# Patient Record
Sex: FEMALE | Race: WHITE | NOT HISPANIC OR LATINO | Employment: PART TIME | URBAN - METROPOLITAN AREA
[De-identification: names, ages, dates, MRNs, and addresses within clinical notes are randomized per-mention and may not be internally consistent; named-entity substitution may affect disease eponyms.]

---

## 2017-06-27 ENCOUNTER — TRANSCRIBE ORDERS (OUTPATIENT)
Dept: ADMINISTRATIVE | Facility: HOSPITAL | Age: 47
End: 2017-06-27

## 2017-06-27 DIAGNOSIS — Z12.31 VISIT FOR SCREENING MAMMOGRAM: Primary | ICD-10-CM

## 2017-07-10 ENCOUNTER — HOSPITAL ENCOUNTER (OUTPATIENT)
Dept: MAMMOGRAPHY | Facility: HOSPITAL | Age: 47
Discharge: HOME | End: 2017-07-10
Attending: OBSTETRICS & GYNECOLOGY | Admitting: OBSTETRICS & GYNECOLOGY

## 2017-07-10 DIAGNOSIS — Z12.31 VISIT FOR SCREENING MAMMOGRAM: ICD-10-CM

## 2017-07-10 PROCEDURE — 77063 BREAST TOMOSYNTHESIS BI: CPT

## 2017-07-10 PROCEDURE — 77067 SCR MAMMO BI INCL CAD: CPT | Performed by: RADIOLOGY

## 2017-07-10 PROCEDURE — 77063 BREAST TOMOSYNTHESIS BI: CPT | Performed by: RADIOLOGY

## 2017-07-10 PROCEDURE — G0202 SCR MAMMO BI INCL CAD: HCPCS

## 2017-09-18 DIAGNOSIS — M25.551 PAIN OF BOTH HIP JOINTS: Primary | ICD-10-CM

## 2017-09-18 DIAGNOSIS — M47.816 SPONDYLOSIS OF LUMBAR REGION WITHOUT MYELOPATHY OR RADICULOPATHY: ICD-10-CM

## 2017-09-18 DIAGNOSIS — M25.552 PAIN OF BOTH HIP JOINTS: Primary | ICD-10-CM

## 2017-09-25 ENCOUNTER — HOSPITAL ENCOUNTER (OUTPATIENT)
Dept: GENERAL RADIOLOGY | Facility: HOSPITAL | Age: 47
Discharge: HOME | End: 2017-09-25
Attending: ANESTHESIOLOGY

## 2017-09-25 ENCOUNTER — HOSPITAL ENCOUNTER (OUTPATIENT)
Dept: GENERAL RADIOLOGY | Facility: HOSPITAL | Age: 47
Discharge: HOME | End: 2017-09-25
Attending: ANESTHESIOLOGY | Admitting: ANESTHESIOLOGY

## 2017-09-25 PROCEDURE — 72114 X-RAY EXAM L-S SPINE BENDING: CPT

## 2017-09-25 PROCEDURE — 73501 X-RAY EXAM HIP UNI 1 VIEW: CPT

## 2017-10-16 ENCOUNTER — TELEPHONE (OUTPATIENT)
Dept: PAIN MEDICINE | Facility: CLINIC | Age: 47
End: 2017-10-16

## 2017-10-16 NOTE — TELEPHONE ENCOUNTER
Chief Complaint:      LEFT SI. LEFT PIRI, LEFT GTB, FACET OA    History of Present Illness:   Patient: Ms. Santa Rojas, 47 y.o. female   Reason for referral: Consultation for intractable chronic left piriformis syndrome and left hip pain.   Pain history: Patient reports a several year history of pain, which began without incident. Pain has progressed in intensity over the past several years.   Pain description: constant pain with intermittent exacerbation, described as sharp and shooting sensation.   Radiation of pain: The pain radiates into the posterior aspect of the hip and lateral aspect of the thigh  The hip pain is more severe than the lower back pain.  Pain pattern:  dermatomic  sclerotomic  myotomic   Pain intensity today: 3/10  Average pain intensity last week: 5/10  Pain intensity ranges from:    Aggravating factors: Pain increases with stepping up, running, laying on either side, going from a seated to a standing position.  Alleviating factors: Pain decreases with walking.   Associated symptoms:   Patient denies numbness or weakness in the lower extremities.   Patient denies any new bladder or bowel problems.   Patient denies difficulties with her balance.     Review of previous therapies and additional medical records:  Santa Rojas has already failed the following measures, including:   Conservative measures: massage, physical therapy, heat and stretching   Interventional measures: SI joint injection  Surgical measures: No previous lumbar surgery   Santa Rojas presents with significant comorbidities including breast cancer, radiation therapy engaged in treatment.  In terms of current analgesics, Santa Rojas takes:   I have reviewed [facility] Report #[id number] consistent to medication reconciliation.    Review of Diagnostic Studies:   09/25/2017 Xray Hip. AP view of the pelvis including both hips are displayed. The  bony pelvis is intact. The SI joints are normal. There are minimal degenerative  changes of the hips. There is no fracture, dislocation or soft tissue abnormality.  09/25/2017  Xray Spine Lumbar. There is mild lumbar scoliosis with the convexity to the left measuring approximately 7.7 degrees. Although this may be positional, a  similar degree of scoliosis was noted on a CT scan of the abdomen and pelvis dated 05/11/2013. Otherwise, there is normal alignment on the lateral projection. There is no compression fracture or subluxation. There is no pars or pedicle abnormality. The bony pelvis is intact.

## 2017-10-18 NOTE — PROGRESS NOTES
"Chief Complaint: \"Pain in my left lower back and left hip. I can't sleep at night because of the pain.\"    History of Present Illness:   Patient: Ms. Santa Rojas, 47 y.o. female   Reason for visit: Consultation for intractable chronic left lower back pain and left gluteal pain.   Patient had a history of left piriformis syndrome and left SI joint dysfunction, which have resolved after fluoroscopic guided steroid injections.  Pain history: Patient reports a several year history of pain, which began without incident. Pain has progressed in intensity over the past several years.   Pain description: constant left lower back pain with intermittent exacerbation, described as sharp and shooting sensation.   Radiation of pain: The lower back pain radiates into the gluteal region and posterior and lateral aspect of the left hip. Occasionally, pain radiates into the and lateral aspect of the thigh with a typical sclerotomic pattern  Pain intensity today: 3/10  Average pain intensity last week: 5/10  Pain intensity ranges from: 3/10 to 8/10  Aggravating factors: Pain increases with extension and rotation of the lumbar spine, stepping up, running, laying on either side, going from a seated to a standing position.  Alleviating factors: Pain decreases sometimes with walking.   Associated symptoms:   Patient denies pain, numbness or weakness in the lower extremities.   Patient denies any new bladder or bowel problems.   Patient denies difficulties with her balance.     Review of previous therapies and additional medical records:  Santa Rojas has already failed the following measures, including:   Conservative measures: oral analgesics, massage, physical therapy, heat and stretching   Interventional measures: Patient had a history of left piriformis syndrome and left SI joint dysfunction, which have resolved after fluoroscopic guided steroid injections.  Surgical measures: No previous lumbar surgery   Santa Rojas presents " with significant comorbidities including breast cancer, radiation therapy engaged in treatment.  In terms of current analgesics, Santa ZAMARRIPA Bob takes: Aleve without significant relief   I have reviewed [facility] Report #[id number] consistent to medication reconciliation.    Review of Diagnostic Studies:   09/25/2017 Xray Hip. AP view of the pelvis including both hips are displayed. The bony pelvis is intact. The SI joints are normal. There are minimal degenerative changes of the hips. There is no fracture, dislocation or soft tissue abnormality.  09/25/2017  Xray Spine Lumbar. There is mild lumbar scoliosis with the convexity to the left measuring approximately 7.7 degrees. Although this may be positional, a similar degree of scoliosis was noted on a CT scan of the abdomen and pelvis dated 05/11/2013. Otherwise, there is normal alignment on the lateral projection. There is no compression fracture or subluxation. There is no pars or pedicle abnormality. The bony pelvis is intact.      Review of Systems   Musculoskeletal: Positive for arthralgias and myalgias.   All other systems reviewed and are negative.     Patient Active Problem List   Diagnosis   • Spondylosis of lumbar region without myelopathy or radiculopathy   • Greater trochanteric bursitis of left hip   • Myofascial pain syndrome   • Scoliosis of lumbar spine   • History of migraine headaches       Past Medical History:   Diagnosis Date   • Extremity pain    • Joint pain    • Migraine          Past Surgical History:   Procedure Laterality Date   • AUGMENTATION MAMMAPLASTY Bilateral 2016         Family History   Problem Relation Age of Onset   • Breast cancer Maternal Grandmother      age unknown   • Ovarian cancer Neg Hx          Social History     Social History   • Marital status:      Spouse name: N/A   • Number of children: N/A   • Years of education: N/A     Social History Main Topics   • Smoking status: Never Smoker   • Smokeless tobacco: Never Used  "  • Alcohol use None   • Drug use: None   • Sexual activity: Not Asked     Other Topics Concern   • None     Social History Narrative        Current Outpatient Prescriptions:   •  naproxen sodium (ALEVE) 220 MG tablet, Take 220 mg by mouth 2 (Two) Times a Day As Needed., Disp: , Rfl:   •  SUMAtriptan (IMITREX) 100 MG tablet, Take 100 mg by mouth Every 2 (Two) Hours As Needed for Migraine., Disp: , Rfl:   •  celecoxib (CELEBREX) 100 MG capsule, Take 1 capsule by mouth 2 (Two) Times a Day As Needed (for pain)., Disp: 60 capsule, Rfl: 1  •  desipramine (NOPRAMIN) 10 MG tablet, Take 1 tablet by mouth Every Night., Disp: 30 tablet, Rfl: 1  •  tiZANidine (ZANAFLEX) 2 MG tablet, Take half to 1 tablet three times a day as needed for muscle spasms., Disp: 90 tablet, Rfl: 1      Allergies   Allergen Reactions   • Bactrim [Sulfamethoxazole-Trimethoprim]          /86 (BP Location: Right arm, Patient Position: Sitting)  Pulse 72  Temp 98.6 °F (37 °C) (Temporal Artery )   Resp 18  Ht 64\" (162.6 cm)  Wt 183 lb (83 kg)  SpO2 100%  BMI 31.41 kg/m2      Physical Exam:  Constitutional: Patient is oriented to person, place, and time. Vital signs are normal. Patient appears well-developed and well-nourished.   HENT: Head: Normocephalic and atraumatic. Eyes: Conjunctivae and lids are normal. Pupils: Equal, round, reactive to light.   Neck: Trachea normal. Neck supple. No JVD present.   Pulmonary Respiratory effort: No increased work of breathing or signs of respiratory distress. Auscultation of lungs: Clear to auscultation.   Cardiovascular Auscultation of heart: Normal rate and rhythm, normal S1 and S2, no murmurs.   Peripheral vascular exam: Normal. No edema.   Musculoskeletal   Gait and station: Gait evaluation demonstrated a normal gait   Lumbar spine: The range of motion of the lumbar spine is limited secondary to pain. Extension and rotation of the lumbar spine increased and reproduced pain. Lumbar facet joint loading " maneuvers are positive at L4-L5 and L5-S1 on the left side.  Tavo and Gaenslen's tests are negative   Piriformis maneuvers are negative   Palpation of the bilateral ischial tuberosities, unrevealing   Palpation of the bilateral greater trochanter, reveals tenderness on the left   Examination of the Iliotibial band: reveals tenderness on the left   Hip joints: The range of motion of the hip joints is full and without pain   Neurological: Patient is alert and oriented to person, place, and time. Speech: speech is normal. Cortical function: Normal mental status.   Cranial nerves: Cranial nerves 2-12 intact.   Reflex Scores:  Right patellar: 2+  Left patellar: 2+  Right achilles: 2+  Left achilles: 2+  Motor strength: 5/5  Motor Tone: normal tone.   Involuntary movements: none.   Superficial/Primitive Reflexes: primitive reflexes were absent.   Right Lofton: absent  Left Lofton: absent  Right ankle clonus: absent  Left ankle clonus: absent   No nuchal rigidity. Negative Kernig and Brudzinski.  Spurling sign is negative. Lhermitte sign is negative. Negative long tract signs. Straight leg raising test is negative. Femoral stretch sign is negative.   Sensation: No sensory loss. Sensory exam: intact to light touch, intact pain and temperature sensation, intact vibration sensation and normal proprioception.   Coordination: Normal finger to nose and heel to shin. Normal balance and negative. Romberg's sign negative.   Skin and subcutaneous tissue: Skin is warm and intact. No rash noted. No cyanosis.   Psychiatric: Judgment and insight: Normal. Orientation to person, place and time: Normal. Recent and remote memory: Intact. Mood and affect: Normal.     ASSESSMENT:   1. Spondylosis of lumbar region without myelopathy or radiculopathy    2. Greater trochanteric bursitis of left hip    3. Myofascial pain syndrome    4. Scoliosis of lumbar spine, unspecified scoliosis type    5. History of migraine headaches      PLAN/MEDICAL  DECISION MAKING: I had a lengthy conversation with Ms. Santa Rojas regarding her chronic pain condition and potential therapeutic options including risks, benefits, alternative therapies, to name a few. Patient has failed to obtain pain relief with conservative measures, as referenced above. I have reviewed all available patient's medical records as well as previous therapies as referenced above.  Therefore, I have proposed the following plan:  2. Pharmacological measures: Reviewed. Discussed.   A. Trial with desipramine 10 mg 1-2 tablets at bedtime  B. Trial with tizanidine 2 mg 1/2 to 1 tablet 3 times a day as needed muscle spasms  C. Take Tylenol 500 mg four times a day as needed for mild to moderate breakthrough pain  D. Trial with celecoxib 100 mg two times a day as needed for moderate to severe breakthrough pain  E. Trial with Flector patches. If beneficial, then, we will send a prescription for Flector patches apply 2 patches to the affected areas, #60 with 11 refills. Otherwise, will send a prescription for prilocaine 2%, lidocaine 10%, imipramine 3%, capsaicin 0.001% and mannitol 20%  cream, apply 1 to 2 grams of cream to the affected areas every 4 to 6 hours as needed  F. I have briefly discussed trials with Limbrel and Rheumate for her OA  3. Interventional pain management measures: Patient will be scheduled for diagnostic and therapeutic left lumbar facet joint injections at L4-L5 and L5-S1 combined with left greater trochanteric bursa injection and trigger point injections at the left gluteus medius muscle to expedite her rehabilitation.  If patient continues to struggle with pain, then, we may consider diagnostic lumbar medial branch blocks versus MRI of the lumbar spine and sacrum without contrast  4. Long-term rehabilitation efforts:  A. Patient will start a comprehensive physical therapy program for water therapy, therapeutic exercise, core strengthening, gait and balance training, ultrasound,  myofascial release, cupping and dry needling   B. Start an exercise program such as yoga, Pilates and water therapy  C. Trial with TENS unit  D. Contrast therapy: Apply ice-packs for 15-20 minutes, followed by heating pads for 15-20 minutes to affected area   E. I will discuss with the patient a potential referral to [facility]  5. The patient has been instructed to contact my office with any questions or difficulties. The patient understands the plan and agrees to proceed accordingly.       Patient Care Team:  No Known Provider as PCP - General  No Known Provider as PCP - Family Medicine  Cresencio Durham MD as Consulting Physician (Pain Medicine)  Reene Saxena MD as Consulting Physician (Obstetrics and Gynecology)     New Medications Ordered This Visit   Medications   • SUMAtriptan (IMITREX) 100 MG tablet     Sig: Take 100 mg by mouth Every 2 (Two) Hours As Needed for Migraine.   • naproxen sodium (ALEVE) 220 MG tablet     Sig: Take 220 mg by mouth 2 (Two) Times a Day As Needed.         No future appointments.      Cresencio Durham MD     EMR Dragon/Transcription disclaimer:  Much of this encounter note is an electronic transcription of spoken language to printed text. Electronic transcription of spoken language may permit erroneous, or at times, nonsensical words or phrases to be inadvertently transcribed. Although I have reviewed the note for such errors, some may still exist.

## 2017-10-19 ENCOUNTER — OFFICE VISIT (OUTPATIENT)
Dept: PAIN MEDICINE | Facility: CLINIC | Age: 47
End: 2017-10-19

## 2017-10-19 VITALS
HEART RATE: 72 BPM | TEMPERATURE: 98.6 F | BODY MASS INDEX: 31.24 KG/M2 | DIASTOLIC BLOOD PRESSURE: 86 MMHG | SYSTOLIC BLOOD PRESSURE: 131 MMHG | WEIGHT: 183 LBS | HEIGHT: 64 IN | RESPIRATION RATE: 18 BRPM | OXYGEN SATURATION: 100 %

## 2017-10-19 DIAGNOSIS — M70.62 GREATER TROCHANTERIC BURSITIS OF LEFT HIP: ICD-10-CM

## 2017-10-19 DIAGNOSIS — M47.816 SPONDYLOSIS OF LUMBAR REGION WITHOUT MYELOPATHY OR RADICULOPATHY: ICD-10-CM

## 2017-10-19 DIAGNOSIS — Z86.69 HISTORY OF MIGRAINE HEADACHES: ICD-10-CM

## 2017-10-19 DIAGNOSIS — M79.18 MYOFASCIAL PAIN SYNDROME: ICD-10-CM

## 2017-10-19 DIAGNOSIS — M41.9 SCOLIOSIS OF LUMBAR SPINE, UNSPECIFIED SCOLIOSIS TYPE: ICD-10-CM

## 2017-10-19 PROCEDURE — 99203 OFFICE O/P NEW LOW 30 MIN: CPT | Performed by: ANESTHESIOLOGY

## 2017-10-19 RX ORDER — SUMATRIPTAN 100 MG/1
100 TABLET, FILM COATED ORAL
COMMUNITY
End: 2021-04-27

## 2017-10-19 RX ORDER — DESIPRAMINE HYDROCHLORIDE 10 MG/1
10 TABLET ORAL NIGHTLY
Qty: 30 TABLET | Refills: 1 | Status: SHIPPED | OUTPATIENT
Start: 2017-10-19 | End: 2021-04-27

## 2017-10-19 RX ORDER — CELECOXIB 100 MG/1
100 CAPSULE ORAL 2 TIMES DAILY PRN
Qty: 60 CAPSULE | Refills: 1 | Status: SHIPPED | OUTPATIENT
Start: 2017-10-19 | End: 2021-04-27

## 2017-10-19 RX ORDER — NAPROXEN SODIUM 220 MG
220 TABLET ORAL 2 TIMES DAILY PRN
COMMUNITY
End: 2023-04-02

## 2017-10-19 RX ORDER — TIZANIDINE 2 MG/1
TABLET ORAL
Qty: 90 TABLET | Refills: 1 | Status: SHIPPED | OUTPATIENT
Start: 2017-10-19 | End: 2021-04-27 | Stop reason: SDUPTHER

## 2017-10-23 ENCOUNTER — OUTSIDE FACILITY SERVICE (OUTPATIENT)
Dept: PAIN MEDICINE | Facility: CLINIC | Age: 47
End: 2017-10-23

## 2017-10-23 PROCEDURE — 64491 INJ PARAVERT F JNT C/T 2 LEV: CPT | Performed by: ANESTHESIOLOGY

## 2017-10-23 PROCEDURE — 64490 INJ PARAVERT F JNT C/T 1 LEV: CPT | Performed by: ANESTHESIOLOGY

## 2017-10-23 PROCEDURE — 20610 DRAIN/INJ JOINT/BURSA W/O US: CPT | Performed by: ANESTHESIOLOGY

## 2017-10-23 RX ORDER — MELOXICAM 7.5 MG/1
TABLET ORAL
Qty: 60 TABLET | Refills: 5 | Status: SHIPPED | OUTPATIENT
Start: 2017-10-23 | End: 2021-04-27

## 2017-12-13 ENCOUNTER — OUTSIDE FACILITY SERVICE (OUTPATIENT)
Dept: PAIN MEDICINE | Facility: CLINIC | Age: 47
End: 2017-12-13

## 2017-12-13 PROCEDURE — 64493 INJ PARAVERT F JNT L/S 1 LEV: CPT | Performed by: ANESTHESIOLOGY

## 2017-12-13 PROCEDURE — 64494 INJ PARAVERT F JNT L/S 2 LEV: CPT | Performed by: ANESTHESIOLOGY

## 2017-12-20 ENCOUNTER — OUTSIDE FACILITY SERVICE (OUTPATIENT)
Dept: PAIN MEDICINE | Facility: CLINIC | Age: 47
End: 2017-12-20

## 2017-12-20 PROCEDURE — 99152 MOD SED SAME PHYS/QHP 5/>YRS: CPT | Performed by: ANESTHESIOLOGY

## 2017-12-20 PROCEDURE — 20553 NJX 1/MLT TRIGGER POINTS 3/>: CPT | Performed by: ANESTHESIOLOGY

## 2017-12-20 PROCEDURE — 20610 DRAIN/INJ JOINT/BURSA W/O US: CPT | Performed by: ANESTHESIOLOGY

## 2017-12-20 PROCEDURE — 64636 DESTROY L/S FACET JNT ADDL: CPT | Performed by: ANESTHESIOLOGY

## 2017-12-20 PROCEDURE — 64635 DESTROY LUMB/SAC FACET JNT: CPT | Performed by: ANESTHESIOLOGY

## 2018-03-07 RX ORDER — CITALOPRAM 20 MG/1
20 TABLET ORAL DAILY
Qty: 30 TABLET | Refills: 5 | Status: SHIPPED | OUTPATIENT
Start: 2018-03-07 | End: 2021-04-27

## 2018-09-13 ENCOUNTER — TRANSCRIBE ORDERS (OUTPATIENT)
Dept: LAB | Facility: HOSPITAL | Age: 48
End: 2018-09-13

## 2018-09-13 ENCOUNTER — LAB (OUTPATIENT)
Dept: LAB | Facility: HOSPITAL | Age: 48
End: 2018-09-13

## 2018-09-13 DIAGNOSIS — R63.5 ABNORMAL WEIGHT GAIN: ICD-10-CM

## 2018-09-13 DIAGNOSIS — R63.5 ABNORMAL WEIGHT GAIN: Primary | ICD-10-CM

## 2018-09-13 LAB
ALBUMIN SERPL-MCNC: 4.3 G/DL (ref 3.2–4.8)
ALBUMIN/GLOB SERPL: 2 G/DL (ref 1.5–2.5)
ALP SERPL-CCNC: 57 U/L (ref 25–100)
ALT SERPL W P-5'-P-CCNC: 15 U/L (ref 7–40)
ANION GAP SERPL CALCULATED.3IONS-SCNC: 6 MMOL/L (ref 3–11)
ARTICHOKE IGE QN: 109 MG/DL (ref 0–130)
AST SERPL-CCNC: 17 U/L (ref 0–33)
BILIRUB SERPL-MCNC: 0.7 MG/DL (ref 0.3–1.2)
BUN BLD-MCNC: 14 MG/DL (ref 9–23)
BUN/CREAT SERPL: 23.7 (ref 7–25)
CALCIUM SPEC-SCNC: 9 MG/DL (ref 8.7–10.4)
CHLORIDE SERPL-SCNC: 103 MMOL/L (ref 99–109)
CHOLEST SERPL-MCNC: 193 MG/DL (ref 0–200)
CO2 SERPL-SCNC: 28 MMOL/L (ref 20–31)
CREAT BLD-MCNC: 0.59 MG/DL (ref 0.6–1.3)
DEPRECATED RDW RBC AUTO: 45.9 FL (ref 37–54)
ERYTHROCYTE [DISTWIDTH] IN BLOOD BY AUTOMATED COUNT: 13.1 % (ref 11.3–14.5)
GFR SERPL CREATININE-BSD FRML MDRD: 110 ML/MIN/1.73
GLOBULIN UR ELPH-MCNC: 2.2 GM/DL
GLUCOSE BLD-MCNC: 73 MG/DL (ref 70–100)
HBA1C MFR BLD: 5.4 % (ref 4.8–5.6)
HCT VFR BLD AUTO: 41.8 % (ref 34.5–44)
HDLC SERPL-MCNC: 81 MG/DL (ref 40–60)
HGB BLD-MCNC: 13.5 G/DL (ref 11.5–15.5)
MCH RBC QN AUTO: 30.7 PG (ref 27–31)
MCHC RBC AUTO-ENTMCNC: 32.3 G/DL (ref 32–36)
MCV RBC AUTO: 95 FL (ref 80–99)
PLATELET # BLD AUTO: 324 10*3/MM3 (ref 150–450)
PMV BLD AUTO: 10.4 FL (ref 6–12)
POTASSIUM BLD-SCNC: 4.3 MMOL/L (ref 3.5–5.5)
PROT SERPL-MCNC: 6.5 G/DL (ref 5.7–8.2)
RBC # BLD AUTO: 4.4 10*6/MM3 (ref 3.89–5.14)
SODIUM BLD-SCNC: 137 MMOL/L (ref 132–146)
TRIGL SERPL-MCNC: 68 MG/DL (ref 0–150)
TSH SERPL DL<=0.05 MIU/L-ACNC: 1.24 MIU/ML (ref 0.35–5.35)
WBC NRBC COR # BLD: 6.97 10*3/MM3 (ref 3.5–10.8)

## 2018-09-13 PROCEDURE — 84443 ASSAY THYROID STIM HORMONE: CPT

## 2018-09-13 PROCEDURE — 85027 COMPLETE CBC AUTOMATED: CPT | Performed by: PHYSICIAN ASSISTANT

## 2018-09-13 PROCEDURE — 80053 COMPREHEN METABOLIC PANEL: CPT

## 2018-09-13 PROCEDURE — 83525 ASSAY OF INSULIN: CPT

## 2018-09-13 PROCEDURE — 36415 COLL VENOUS BLD VENIPUNCTURE: CPT

## 2018-09-13 PROCEDURE — 80061 LIPID PANEL: CPT | Performed by: PHYSICIAN ASSISTANT

## 2018-09-13 PROCEDURE — 83036 HEMOGLOBIN GLYCOSYLATED A1C: CPT | Performed by: PHYSICIAN ASSISTANT

## 2018-09-16 LAB — INSULIN SERPL-ACNC: 3.2 UIU/ML

## 2019-07-15 ENCOUNTER — TRANSCRIBE ORDERS (OUTPATIENT)
Dept: ADMINISTRATIVE | Facility: HOSPITAL | Age: 49
End: 2019-07-15

## 2019-07-15 DIAGNOSIS — Z12.31 VISIT FOR SCREENING MAMMOGRAM: Primary | ICD-10-CM

## 2019-08-27 ENCOUNTER — HOSPITAL ENCOUNTER (OUTPATIENT)
Dept: MAMMOGRAPHY | Facility: HOSPITAL | Age: 49
Discharge: HOME | End: 2019-08-27
Admitting: OBSTETRICS & GYNECOLOGY

## 2019-08-27 DIAGNOSIS — Z12.31 VISIT FOR SCREENING MAMMOGRAM: ICD-10-CM

## 2019-08-27 PROCEDURE — 77063 BREAST TOMOSYNTHESIS BI: CPT | Performed by: RADIOLOGY

## 2019-08-27 PROCEDURE — 77063 BREAST TOMOSYNTHESIS BI: CPT

## 2019-08-27 PROCEDURE — 77067 SCR MAMMO BI INCL CAD: CPT | Performed by: RADIOLOGY

## 2019-08-27 PROCEDURE — 77067 SCR MAMMO BI INCL CAD: CPT

## 2019-09-24 ENCOUNTER — HOSPITAL ENCOUNTER (OUTPATIENT)
Dept: MAMMOGRAPHY | Facility: HOSPITAL | Age: 49
Discharge: HOME | End: 2019-09-24
Admitting: RADIOLOGY

## 2019-09-24 DIAGNOSIS — R92.8 ABNORMAL MAMMOGRAM: ICD-10-CM

## 2019-09-24 PROCEDURE — 77061 BREAST TOMOSYNTHESIS UNI: CPT | Performed by: RADIOLOGY

## 2019-09-24 PROCEDURE — G0279 TOMOSYNTHESIS, MAMMO: HCPCS

## 2019-09-24 PROCEDURE — 77065 DX MAMMO INCL CAD UNI: CPT | Performed by: RADIOLOGY

## 2019-09-24 PROCEDURE — 77065 DX MAMMO INCL CAD UNI: CPT

## 2020-09-15 ENCOUNTER — TRANSCRIBE ORDERS (OUTPATIENT)
Dept: ADMINISTRATIVE | Facility: HOSPITAL | Age: 50
End: 2020-09-15

## 2020-09-15 DIAGNOSIS — Z12.31 VISIT FOR SCREENING MAMMOGRAM: Primary | ICD-10-CM

## 2020-12-02 DIAGNOSIS — Z12.11 SCREENING FOR COLON CANCER: Primary | ICD-10-CM

## 2020-12-10 ENCOUNTER — HOSPITAL ENCOUNTER (OUTPATIENT)
Dept: MAMMOGRAPHY | Facility: HOSPITAL | Age: 50
Discharge: HOME | End: 2020-12-10
Admitting: OBSTETRICS & GYNECOLOGY

## 2020-12-10 DIAGNOSIS — Z12.31 VISIT FOR SCREENING MAMMOGRAM: ICD-10-CM

## 2020-12-10 PROCEDURE — 77067 SCR MAMMO BI INCL CAD: CPT

## 2020-12-10 PROCEDURE — 77063 BREAST TOMOSYNTHESIS BI: CPT | Performed by: RADIOLOGY

## 2020-12-10 PROCEDURE — 77063 BREAST TOMOSYNTHESIS BI: CPT

## 2020-12-10 PROCEDURE — 77067 SCR MAMMO BI INCL CAD: CPT | Performed by: RADIOLOGY

## 2020-12-14 ENCOUNTER — APPOINTMENT (OUTPATIENT)
Dept: PREADMISSION TESTING | Facility: HOSPITAL | Age: 50
End: 2020-12-14

## 2020-12-14 PROCEDURE — U0004 COV-19 TEST NON-CDC HGH THRU: HCPCS

## 2020-12-14 PROCEDURE — C9803 HOPD COVID-19 SPEC COLLECT: HCPCS

## 2020-12-15 ENCOUNTER — APPOINTMENT (OUTPATIENT)
Dept: PREADMISSION TESTING | Facility: HOSPITAL | Age: 50
End: 2020-12-15

## 2020-12-15 LAB — SARS-COV-2 RNA RESP QL NAA+PROBE: NOT DETECTED

## 2020-12-17 ENCOUNTER — OUTSIDE FACILITY SERVICE (OUTPATIENT)
Dept: GASTROENTEROLOGY | Facility: CLINIC | Age: 50
End: 2020-12-17

## 2020-12-17 PROCEDURE — G0500 MOD SEDAT ENDO SERVICE >5YRS: HCPCS | Performed by: INTERNAL MEDICINE

## 2020-12-17 PROCEDURE — 45378 DIAGNOSTIC COLONOSCOPY: CPT | Performed by: INTERNAL MEDICINE

## 2021-01-07 ENCOUNTER — IMMUNIZATION (OUTPATIENT)
Dept: VACCINE CLINIC | Facility: HOSPITAL | Age: 51
End: 2021-01-07

## 2021-01-07 PROCEDURE — 0001A: CPT | Performed by: INTERNAL MEDICINE

## 2021-01-07 PROCEDURE — 91300 HC SARSCOV02 VAC 30MCG/0.3ML IM: CPT | Performed by: INTERNAL MEDICINE

## 2021-01-28 ENCOUNTER — IMMUNIZATION (OUTPATIENT)
Dept: VACCINE CLINIC | Facility: HOSPITAL | Age: 51
End: 2021-01-28

## 2021-01-28 PROCEDURE — 0002A: CPT | Performed by: INTERNAL MEDICINE

## 2021-01-28 PROCEDURE — 91300 HC SARSCOV02 VAC 30MCG/0.3ML IM: CPT | Performed by: INTERNAL MEDICINE

## 2021-03-25 ENCOUNTER — PATIENT MESSAGE (OUTPATIENT)
Dept: PAIN MEDICINE | Facility: CLINIC | Age: 51
End: 2021-03-25

## 2021-03-30 DIAGNOSIS — M53.3 SACROILIAC JOINT DYSFUNCTION OF LEFT SIDE: Primary | ICD-10-CM

## 2021-03-30 PROBLEM — G57.02 PIRIFORMIS SYNDROME OF LEFT SIDE: Status: ACTIVE | Noted: 2021-03-30

## 2021-04-05 ENCOUNTER — OUTSIDE FACILITY SERVICE (OUTPATIENT)
Dept: PAIN MEDICINE | Facility: CLINIC | Age: 51
End: 2021-04-05

## 2021-04-05 PROCEDURE — 27096 INJECT SACROILIAC JOINT: CPT | Performed by: ANESTHESIOLOGY

## 2021-04-06 ENCOUNTER — TELEPHONE (OUTPATIENT)
Dept: PAIN MEDICINE | Facility: CLINIC | Age: 51
End: 2021-04-06

## 2021-04-06 NOTE — TELEPHONE ENCOUNTER
LVM for patient to call us with any questions or concerns regarding her injection.   Advised patient to alternate heat and ice, tylenol and ibuprofen if pain is to occur.  Also stated that she does not have a follow up, she is to follow up PRN.

## 2021-04-26 ENCOUNTER — PATIENT MESSAGE (OUTPATIENT)
Dept: PAIN MEDICINE | Facility: CLINIC | Age: 51
End: 2021-04-26

## 2021-04-27 DIAGNOSIS — M47.816 SPONDYLOSIS OF LUMBAR REGION WITHOUT MYELOPATHY OR RADICULOPATHY: Primary | ICD-10-CM

## 2021-05-03 ENCOUNTER — OUTSIDE FACILITY SERVICE (OUTPATIENT)
Dept: PAIN MEDICINE | Facility: CLINIC | Age: 51
End: 2021-05-03

## 2021-05-03 DIAGNOSIS — M47.816 SPONDYLOSIS OF LUMBAR REGION WITHOUT MYELOPATHY OR RADICULOPATHY: Primary | ICD-10-CM

## 2021-05-03 PROCEDURE — 64493 INJ PARAVERT F JNT L/S 1 LEV: CPT | Performed by: ANESTHESIOLOGY

## 2021-05-03 PROCEDURE — 64494 INJ PARAVERT F JNT L/S 2 LEV: CPT | Performed by: ANESTHESIOLOGY

## 2021-05-17 ENCOUNTER — APPOINTMENT (OUTPATIENT)
Dept: PREADMISSION TESTING | Facility: HOSPITAL | Age: 51
End: 2021-05-17

## 2021-05-17 DIAGNOSIS — M47.816 SPONDYLOSIS OF LUMBAR REGION WITHOUT MYELOPATHY OR RADICULOPATHY: Primary | ICD-10-CM

## 2021-05-24 ENCOUNTER — TELEPHONE (OUTPATIENT)
Dept: PAIN MEDICINE | Facility: CLINIC | Age: 51
End: 2021-05-24

## 2021-05-28 ENCOUNTER — HOSPITAL ENCOUNTER (OUTPATIENT)
Dept: GENERAL RADIOLOGY | Facility: HOSPITAL | Age: 51
Discharge: HOME | End: 2021-05-28
Admitting: ANESTHESIOLOGY

## 2021-05-28 PROCEDURE — 72082 X-RAY EXAM ENTIRE SPI 2/3 VW: CPT

## 2021-05-28 PROCEDURE — 72114 X-RAY EXAM L-S SPINE BENDING: CPT

## 2021-05-30 ENCOUNTER — APPOINTMENT (OUTPATIENT)
Dept: PREADMISSION TESTING | Facility: HOSPITAL | Age: 51
End: 2021-05-30

## 2021-06-02 ENCOUNTER — OUTSIDE FACILITY SERVICE (OUTPATIENT)
Dept: PAIN MEDICINE | Facility: CLINIC | Age: 51
End: 2021-06-02

## 2021-06-02 PROCEDURE — 64635 DESTROY LUMB/SAC FACET JNT: CPT | Performed by: ANESTHESIOLOGY

## 2021-06-02 PROCEDURE — 99152 MOD SED SAME PHYS/QHP 5/>YRS: CPT | Performed by: ANESTHESIOLOGY

## 2021-06-02 PROCEDURE — 64636 DESTROY L/S FACET JNT ADDL: CPT | Performed by: ANESTHESIOLOGY

## 2021-06-02 PROCEDURE — 20552 NJX 1/MLT TRIGGER POINT 1/2: CPT | Performed by: ANESTHESIOLOGY

## 2021-06-03 ENCOUNTER — TELEPHONE (OUTPATIENT)
Dept: PAIN MEDICINE | Facility: CLINIC | Age: 51
End: 2021-06-03

## 2021-06-03 NOTE — TELEPHONE ENCOUNTER
SPOKE WITH PATIENT ,SHE IS DOING GREAT FROM HER INJECTION, HOWEVER SHE FELL THIS MORNING IN THE SHOWER AND IS SORE FROM THAT.  PATIENT HAS NOT COMPLAINTS AND IS NOT WANTING OFFICE TO BE WORRIED ABOUT HER FALL, STATES SHE IS JUST SORE.

## 2021-06-04 ENCOUNTER — TELEPHONE (OUTPATIENT)
Dept: PAIN MEDICINE | Facility: CLINIC | Age: 51
End: 2021-06-04

## 2021-06-04 NOTE — TELEPHONE ENCOUNTER
LVM for patient to call us with any questions or concerns regarding her/his injection. Advised patient to alternate heat and ice, tylenol and ibuprofen if pain is to occur, and to start Physical Therapy Also advised that that she doesn't have a f/u with us, to call as needed.

## 2021-06-23 DIAGNOSIS — M47.816 SPONDYLOSIS OF LUMBAR REGION WITHOUT MYELOPATHY OR RADICULOPATHY: Primary | ICD-10-CM

## 2021-08-09 ENCOUNTER — OFFICE VISIT (OUTPATIENT)
Dept: FAMILY MEDICINE CLINIC | Facility: CLINIC | Age: 51
End: 2021-08-09

## 2021-08-09 VITALS
WEIGHT: 174 LBS | HEART RATE: 72 BPM | DIASTOLIC BLOOD PRESSURE: 84 MMHG | BODY MASS INDEX: 29.71 KG/M2 | SYSTOLIC BLOOD PRESSURE: 124 MMHG | HEIGHT: 64 IN | RESPIRATION RATE: 18 BRPM | TEMPERATURE: 98 F | OXYGEN SATURATION: 98 %

## 2021-08-09 DIAGNOSIS — Z11.59 NEED FOR HEPATITIS C SCREENING TEST: ICD-10-CM

## 2021-08-09 DIAGNOSIS — M76.61 RIGHT ACHILLES TENDINITIS: Primary | ICD-10-CM

## 2021-08-09 DIAGNOSIS — H69.83 ETD (EUSTACHIAN TUBE DYSFUNCTION), BILATERAL: ICD-10-CM

## 2021-08-09 DIAGNOSIS — Z13.220 SCREENING FOR HYPERLIPIDEMIA: ICD-10-CM

## 2021-08-09 DIAGNOSIS — Z13.29 THYROID DISORDER SCREENING: ICD-10-CM

## 2021-08-09 PROCEDURE — 99203 OFFICE O/P NEW LOW 30 MIN: CPT | Performed by: FAMILY MEDICINE

## 2021-08-09 NOTE — PROGRESS NOTES
"Chief Complaint  Establish Care (Transferring from  ) and R heel pain    Subjective          Santa Rojas presents to [facility]  History of Present Illness  Here to establish care   Recently positive for Covid, recovering from symptoms.   Symptoms started 5 days ago, mild head congestion.   +ear pressure  Denies shortness of breath    Right heel and achilles tendon pain   Going to PT for treatment of piriformis syndrome. Her therapist has addressed foot pain, too.  Taking Aleve, applying ice and heat, doing stretches at home.   This is helping relieve her symptoms    The following portions of the patient's history were reviewed and updated as appropriate: allergies, current medications, past family history, past medical history, past social history, past surgical history and problem list.    Objective   Vital Signs:   /84   Pulse 72   Temp 98 °F (36.7 °C)   Resp 18   Ht 162.6 cm (64\")   Wt 78.9 kg (174 lb)   SpO2 98%   BMI 29.87 kg/m²     Physical Exam  Vitals and nursing note reviewed.   Constitutional:       Appearance: Normal appearance. She is well-developed. She is not ill-appearing.   HENT:      Head: Normocephalic and atraumatic.      Right Ear: Ear canal and external ear normal. A middle ear effusion is present. Tympanic membrane is not injected.      Left Ear: Ear canal and external ear normal. A middle ear effusion is present. Tympanic membrane is not injected.      Nose: Nose normal.   Eyes:      Conjunctiva/sclera: Conjunctivae normal.   Cardiovascular:      Rate and Rhythm: Normal rate and regular rhythm.      Heart sounds: Normal heart sounds. No murmur heard.     Pulmonary:      Effort: Pulmonary effort is normal.      Breath sounds: Normal breath sounds. No wheezing.   Musculoskeletal:         General: No swelling or deformity.      Cervical back: Neck supple.   Skin:     General: Skin is warm and dry.   Neurological:      General: No focal deficit " present.      Mental Status: She is alert and oriented to person, place, and time.   Psychiatric:         Mood and Affect: Mood normal.         Behavior: Behavior normal.         Thought Content: Thought content normal.        Result Review :                 Assessment and Plan    Diagnoses and all orders for this visit:    1. Right Achilles tendinitis (Primary)  -     CBC & Differential  -     Comprehensive Metabolic Panel    2. ETD (Eustachian tube dysfunction), bilateral    3. Screening for hyperlipidemia  -     CBC & Differential  -     Comprehensive Metabolic Panel  -     Lipid Panel With / Chol / HDL Ratio    4. Thyroid disorder screening  -     CBC & Differential  -     Comprehensive Metabolic Panel  -     TSH Rfx On Abnormal To Free T4    5. Need for hepatitis C screening test  -     CBC & Differential  -     Comprehensive Metabolic Panel  -     Hepatitis C Antibody    She will complete fasting labs in future   Flonase NS and OTC antihistamine to treat ETD  Continue conservative treatment for achilles tendonitis     Follow Up   Return if symptoms worsen or fail to improve.  Patient was given instructions and counseling regarding her condition or for health maintenance advice. Please see specific information pulled into the AVS if appropriate.

## 2021-08-09 NOTE — PATIENT INSTRUCTIONS
Go to the nearest ER or return to clinic if symptoms worsen, fever/chill develop    Achilles Tendinitis Rehab  Ask your health care provider which exercises are safe for you. Do exercises exactly as told by your health care provider and adjust them as directed. It is normal to feel mild stretching, pulling, tightness, or discomfort as you do these exercises. Stop right away if you feel sudden pain or your pain gets worse. Do not begin these exercises until told by your health care provider.  Stretching and range-of-motion exercises  These exercises warm up your muscles and joints and improve the movement and flexibility of your ankle. These exercises also help to relieve pain.  Standing wall calf stretch with straight knee    1. Stand with your hands against a wall.  2. Extend your left / right leg behind you, and bend your front knee slightly. Keep both of your heels on the floor.  3. Point the toes of your back foot slightly inward.  4. Keeping your heels on the floor and your back knee straight, shift your weight toward the wall. Do not allow your back to arch. You should feel a gentle stretch in your upper calf.  5. Hold this position for __________ seconds.  Repeat __________ times. Complete this exercise __________ times a day.  Standing wall calf stretch with bent knee  1. Stand with your hands against a wall.  2. Extend your left / right leg behind you, and bend your front knee slightly. Keep both of your heels on the floor.  3. Point the toes of your back foot slightly inward.  4. Keeping your heels on the floor, bend your back knee slightly. You should feel a gentle stretch deep in your lower calf near your heel.  5. Hold this position for __________ seconds.  Repeat __________ times. Complete this exercise __________ times a day.  Strengthening exercises  These exercises build strength and control of your ankle. Endurance is the ability to use your muscles for a long time, even after they get  tired.  Plantar flexion with band  In this exercise, you push your toes downward, away from you, with an exercise band providing resistance.  1. Sit on the floor with your left / right leg extended. You may put a pillow under your calf to give your foot more room to move.  2. Loop a rubber exercise band or tube around the ball of your left / right foot. The ball of your foot is on the walking surface, right under your toes. The band or tube should be slightly tense when your foot is relaxed. If the band or tube slips, you can put on your shoe or put a washcloth between the band and your foot to help it stay in place.  3. Slowly point your toes downward, pushing them away from you (plantar flexion).  4. Hold this position for __________ seconds.  5. Slowly release the tension in the band or tube, controlling smoothly until your foot is back to the starting position.  6. Repeat steps 1-5 with your left / right leg.  Repeat __________ times. Complete this exercise __________ times a day.  Eccentric heel drop    In this exercise, you stand and slowly raise your heel and then slowly lower it. This exercise lengthens the calf muscles (eccentric) while the heel bears weight.  If this exercise is too easy, try doing it while wearing a backpack with weights in it.  1. Stand on a step with the balls of your feet. The ball of your foot is on the walking surface, right under your toes.  ? Do not put your heels on the step.  ? For balance, rest your hands on the wall or on a railing.  2. Rise up onto the balls of your feet.  3. Keeping your heels up, shift all of your weight to your left / right leg and  your other leg.  4. Slowly lower your left / right leg so your heel drops below the level of the step.  5. Put down your other foot before returning to the start position. If told by your health care provider, build up to:  ? 3 sets of 15 repetitions while keeping your knees straight.  ? 3 sets of 15 repetitions while  keeping your knees slightly bent as far as told by your health care provider.  Repeat __________ times. Complete this exercise __________ times a day.  Balance exercises  These exercises improve or maintain your balance. Balance is important in preventing falls.  Single leg stand  If this exercise is too easy, you can try it with your eyes closed or while standing on a pillow.  1. Without shoes, stand near a railing or in a door frame. Hold on to the railing or door frame as needed.  2. Stand on your left / right foot. Keep your big toe down on the floor and try to keep your arch lifted.  3. Hold this position for __________ seconds.  Repeat __________ times. Complete this exercise __________ times a day.  This information is not intended to replace advice given to you by your health care provider. Make sure you discuss any questions you have with your health care provider.  Document Revised: 04/06/2020 Document Reviewed: 09/30/2019  [employer] Patient Education © 2021 [employer]

## 2021-08-19 ENCOUNTER — PATIENT ROUNDING (BHMG ONLY) (OUTPATIENT)
Dept: FAMILY MEDICINE CLINIC | Facility: CLINIC | Age: 51
End: 2021-08-19

## 2021-08-19 DIAGNOSIS — Z13.220 SCREENING FOR HYPERLIPIDEMIA: Primary | ICD-10-CM

## 2021-08-19 DIAGNOSIS — Z11.59 NEED FOR HEPATITIS C SCREENING TEST: ICD-10-CM

## 2021-08-19 DIAGNOSIS — N95.1 PERIMENOPAUSAL: ICD-10-CM

## 2021-08-19 DIAGNOSIS — Z13.29 THYROID DISORDER SCREENING: ICD-10-CM

## 2021-08-19 DIAGNOSIS — F39 MOOD DISORDER (HCC): ICD-10-CM

## 2021-08-19 RX ORDER — VENLAFAXINE HYDROCHLORIDE 37.5 MG/1
37.5 CAPSULE, EXTENDED RELEASE ORAL DAILY
Qty: 30 CAPSULE | Refills: 1 | Status: SHIPPED | OUTPATIENT
Start: 2021-08-19 | End: 2021-09-10 | Stop reason: ALTCHOICE

## 2021-09-10 RX ORDER — FLUOXETINE 10 MG/1
10 CAPSULE ORAL DAILY
Qty: 30 CAPSULE | Refills: 1 | Status: SHIPPED | OUTPATIENT
Start: 2021-09-10 | End: 2021-09-15 | Stop reason: ALTCHOICE

## 2021-09-15 DIAGNOSIS — F39 MOOD DISORDER (HCC): Primary | ICD-10-CM

## 2021-09-15 RX ORDER — BUPROPION HYDROCHLORIDE 150 MG/1
150 TABLET ORAL DAILY
Qty: 90 TABLET | Refills: 1 | Status: SHIPPED | OUTPATIENT
Start: 2021-09-15 | End: 2022-02-03 | Stop reason: SDUPTHER

## 2021-09-21 ENCOUNTER — LAB (OUTPATIENT)
Dept: LAB | Facility: HOSPITAL | Age: 51
End: 2021-09-21

## 2021-09-21 DIAGNOSIS — N95.1 PERIMENOPAUSAL: ICD-10-CM

## 2021-09-21 DIAGNOSIS — Z13.220 SCREENING FOR HYPERLIPIDEMIA: ICD-10-CM

## 2021-09-21 DIAGNOSIS — F39 MOOD DISORDER (HCC): ICD-10-CM

## 2021-09-21 DIAGNOSIS — Z13.29 THYROID DISORDER SCREENING: ICD-10-CM

## 2021-09-21 DIAGNOSIS — Z11.59 NEED FOR HEPATITIS C SCREENING TEST: ICD-10-CM

## 2021-09-21 LAB
ALBUMIN SERPL-MCNC: 4.5 G/DL (ref 3.5–5.2)
ALBUMIN/GLOB SERPL: 1.6 G/DL
ALP SERPL-CCNC: 73 U/L (ref 39–117)
ALT SERPL W P-5'-P-CCNC: 20 U/L (ref 1–33)
ANION GAP SERPL CALCULATED.3IONS-SCNC: 8.2 MMOL/L (ref 5–15)
AST SERPL-CCNC: 21 U/L (ref 1–32)
BASOPHILS # BLD AUTO: 0.03 10*3/MM3 (ref 0–0.2)
BASOPHILS NFR BLD AUTO: 0.5 % (ref 0–1.5)
BILIRUB SERPL-MCNC: 0.6 MG/DL (ref 0–1.2)
BUN SERPL-MCNC: 13 MG/DL (ref 6–20)
BUN/CREAT SERPL: 20 (ref 7–25)
CALCIUM SPEC-SCNC: 9.1 MG/DL (ref 8.6–10.5)
CHLORIDE SERPL-SCNC: 101 MMOL/L (ref 98–107)
CHOLEST SERPL-MCNC: 257 MG/DL (ref 0–200)
CO2 SERPL-SCNC: 28.8 MMOL/L (ref 22–29)
CREAT SERPL-MCNC: 0.65 MG/DL (ref 0.57–1)
DEPRECATED RDW RBC AUTO: 42.7 FL (ref 37–54)
EOSINOPHIL # BLD AUTO: 0.16 10*3/MM3 (ref 0–0.4)
EOSINOPHIL NFR BLD AUTO: 2.9 % (ref 0.3–6.2)
ERYTHROCYTE [DISTWIDTH] IN BLOOD BY AUTOMATED COUNT: 12.7 % (ref 12.3–15.4)
ESTRADIOL SERPL HS-MCNC: 121 PG/ML
FSH SERPL-ACNC: 24.2 MIU/ML
GFR SERPL CREATININE-BSD FRML MDRD: 97 ML/MIN/1.73
GLOBULIN UR ELPH-MCNC: 2.8 GM/DL
GLUCOSE SERPL-MCNC: 78 MG/DL (ref 65–99)
HCT VFR BLD AUTO: 44.1 % (ref 34–46.6)
HCV AB SER DONR QL: NORMAL
HDLC SERPL QL: 2.71
HDLC SERPL-MCNC: 95 MG/DL (ref 40–60)
HGB BLD-MCNC: 14.5 G/DL (ref 12–15.9)
IMM GRANULOCYTES # BLD AUTO: 0.01 10*3/MM3 (ref 0–0.05)
IMM GRANULOCYTES NFR BLD AUTO: 0.2 % (ref 0–0.5)
LDLC SERPL CALC-MCNC: 150 MG/DL (ref 0–100)
LH SERPL-ACNC: 17.4 MIU/ML
LYMPHOCYTES # BLD AUTO: 1.24 10*3/MM3 (ref 0.7–3.1)
LYMPHOCYTES NFR BLD AUTO: 22.5 % (ref 19.6–45.3)
MCH RBC QN AUTO: 30.4 PG (ref 26.6–33)
MCHC RBC AUTO-ENTMCNC: 32.9 G/DL (ref 31.5–35.7)
MCV RBC AUTO: 92.5 FL (ref 79–97)
MONOCYTES # BLD AUTO: 0.46 10*3/MM3 (ref 0.1–0.9)
MONOCYTES NFR BLD AUTO: 8.4 % (ref 5–12)
NEUTROPHILS NFR BLD AUTO: 3.6 10*3/MM3 (ref 1.7–7)
NEUTROPHILS NFR BLD AUTO: 65.5 % (ref 42.7–76)
NRBC BLD AUTO-RTO: 0 /100 WBC (ref 0–0.2)
PLATELET # BLD AUTO: 337 10*3/MM3 (ref 140–450)
PMV BLD AUTO: 9.9 FL (ref 6–12)
POTASSIUM SERPL-SCNC: 4.4 MMOL/L (ref 3.5–5.2)
PROGEST SERPL-MCNC: 0.36 NG/ML
PROT SERPL-MCNC: 7.3 G/DL (ref 6–8.5)
RBC # BLD AUTO: 4.77 10*6/MM3 (ref 3.77–5.28)
SODIUM SERPL-SCNC: 138 MMOL/L (ref 136–145)
TRIGL SERPL-MCNC: 73 MG/DL (ref 0–150)
TSH SERPL DL<=0.05 MIU/L-ACNC: 1.59 UIU/ML (ref 0.27–4.2)
VLDLC SERPL-MCNC: 12 MG/DL (ref 5–40)
WBC # BLD AUTO: 5.5 10*3/MM3 (ref 3.4–10.8)

## 2021-09-21 PROCEDURE — 83002 ASSAY OF GONADOTROPIN (LH): CPT

## 2021-09-21 PROCEDURE — 82670 ASSAY OF TOTAL ESTRADIOL: CPT

## 2021-09-21 PROCEDURE — 80061 LIPID PANEL: CPT

## 2021-09-21 PROCEDURE — 36415 COLL VENOUS BLD VENIPUNCTURE: CPT

## 2021-09-21 PROCEDURE — 84144 ASSAY OF PROGESTERONE: CPT

## 2021-09-21 PROCEDURE — 80053 COMPREHEN METABOLIC PANEL: CPT

## 2021-09-21 PROCEDURE — 86803 HEPATITIS C AB TEST: CPT

## 2021-09-21 PROCEDURE — 83001 ASSAY OF GONADOTROPIN (FSH): CPT

## 2021-09-21 PROCEDURE — 84443 ASSAY THYROID STIM HORMONE: CPT

## 2021-09-21 PROCEDURE — 85025 COMPLETE CBC W/AUTO DIFF WBC: CPT

## 2021-10-05 ENCOUNTER — TELEPHONE (OUTPATIENT)
Dept: PAIN MEDICINE | Facility: CLINIC | Age: 51
End: 2021-10-05

## 2021-10-05 DIAGNOSIS — M47.816 SPONDYLOSIS OF LUMBAR REGION WITHOUT MYELOPATHY OR RADICULOPATHY: Primary | ICD-10-CM
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
## 2021-10-05 NOTE — TELEPHONE ENCOUNTER
Caller: PRISCILLA HIGH    Relationship to patient: SELF    Best call back number:  [phone]    Chief complaint: WORSENING NUMBNESS AND TINGLING DOWN LEFT LEG TO TOES. PATIENT HAS F/U APPT WITH MAEVE HOWARD ON 11/1/21. ASKING IF CAN HAVE MRI ORDERED BEFORE APPT.    Type of visit:  MRI    Requested date: BEFORE APPT ON 11/1/21

## 2021-10-11 NOTE — TELEPHONE ENCOUNTER
I called and spoke with Santa about ordering additional imaging, I will speak with Dr. Durham to ensure piriformis and sacrum will be visualized on the MRI.  Otherwise, I will call him with the patient no more imaging is necessary.  She verbalized understanding was thankful for the call.

## 2021-10-12 DIAGNOSIS — G57.02 PIRIFORMIS SYNDROME OF LEFT SIDE: Primary | ICD-10-CM

## 2021-10-21 ENCOUNTER — HOSPITAL ENCOUNTER (OUTPATIENT)
Dept: MRI IMAGING | Facility: HOSPITAL | Age: 51
Discharge: HOME | End: 2021-10-21

## 2021-10-21 DIAGNOSIS — G57.02 PIRIFORMIS SYNDROME OF LEFT SIDE: ICD-10-CM

## 2021-10-21 DIAGNOSIS — M47.816 SPONDYLOSIS OF LUMBAR REGION WITHOUT MYELOPATHY OR RADICULOPATHY: ICD-10-CM

## 2021-10-22 DIAGNOSIS — G57.02 PIRIFORMIS SYNDROME OF LEFT SIDE: Primary | ICD-10-CM

## 2021-10-22 DIAGNOSIS — M47.816 SPONDYLOSIS OF LUMBAR REGION WITHOUT MYELOPATHY OR RADICULOPATHY: ICD-10-CM

## 2021-11-01 ENCOUNTER — OFFICE VISIT (OUTPATIENT)
Dept: PAIN MEDICINE | Facility: CLINIC | Age: 51
End: 2021-11-01

## 2021-11-01 VITALS
WEIGHT: 178 LBS | HEIGHT: 55 IN | TEMPERATURE: 97.6 F | RESPIRATION RATE: 14 BRPM | DIASTOLIC BLOOD PRESSURE: 88 MMHG | HEART RATE: 71 BPM | SYSTOLIC BLOOD PRESSURE: 142 MMHG | OXYGEN SATURATION: 98 % | BODY MASS INDEX: 41.19 KG/M2

## 2021-11-01 DIAGNOSIS — M47.816 SPONDYLOSIS OF LUMBAR REGION WITHOUT MYELOPATHY OR RADICULOPATHY: ICD-10-CM

## 2021-11-01 DIAGNOSIS — M25.852 IMPINGEMENT SYNDROME, HIP, LEFT: ICD-10-CM

## 2021-11-01 DIAGNOSIS — M70.62 GREATER TROCHANTERIC BURSITIS OF LEFT HIP: ICD-10-CM

## 2021-11-01 DIAGNOSIS — G57.02 PIRIFORMIS SYNDROME OF LEFT SIDE: ICD-10-CM

## 2021-11-01 DIAGNOSIS — M51.36 DDD (DEGENERATIVE DISC DISEASE), LUMBAR: ICD-10-CM

## 2021-11-01 DIAGNOSIS — M41.9 SCOLIOSIS OF LUMBAR SPINE, UNSPECIFIED SCOLIOSIS TYPE: ICD-10-CM

## 2021-11-01 DIAGNOSIS — M79.18 MYOFASCIAL PAIN SYNDROME: ICD-10-CM

## 2021-11-01 DIAGNOSIS — M51.37 DEGENERATION OF LUMBAR OR LUMBOSACRAL INTERVERTEBRAL DISC: ICD-10-CM

## 2021-11-01 DIAGNOSIS — M53.3 SACROILIAC JOINT DYSFUNCTION OF LEFT SIDE: ICD-10-CM

## 2021-11-01 PROCEDURE — 99213 OFFICE O/P EST LOW 20 MIN: CPT | Performed by: NURSE PRACTITIONER

## 2021-11-11 ENCOUNTER — TRANSCRIBE ORDERS (OUTPATIENT)
Dept: ADMINISTRATIVE | Facility: HOSPITAL | Age: 51
End: 2021-11-11

## 2021-11-11 DIAGNOSIS — Z12.31 VISIT FOR SCREENING MAMMOGRAM: Primary | ICD-10-CM

## 2021-11-18 DIAGNOSIS — M51.37 DEGENERATION OF LUMBAR OR LUMBOSACRAL INTERVERTEBRAL DISC: Primary | ICD-10-CM

## 2021-11-18 RX ORDER — ALPRAZOLAM 0.5 MG/1
0.5 TABLET ORAL ONCE
Qty: 2 TABLET | Refills: 0 | Status: SHIPPED | OUTPATIENT
Start: 2021-11-18 | End: 2021-11-18

## 2021-11-23 ENCOUNTER — HOSPITAL ENCOUNTER (OUTPATIENT)
Dept: MRI IMAGING | Facility: HOSPITAL | Age: 51
Discharge: HOME | End: 2021-11-23

## 2021-11-23 VITALS
HEART RATE: 68 BPM | OXYGEN SATURATION: 99 % | DIASTOLIC BLOOD PRESSURE: 62 MMHG | RESPIRATION RATE: 18 BRPM | SYSTOLIC BLOOD PRESSURE: 105 MMHG

## 2021-11-23 VITALS
SYSTOLIC BLOOD PRESSURE: 124 MMHG | OXYGEN SATURATION: 99 % | RESPIRATION RATE: 18 BRPM | WEIGHT: 180 LBS | BODY MASS INDEX: 30.73 KG/M2 | TEMPERATURE: 97.7 F | DIASTOLIC BLOOD PRESSURE: 70 MMHG | HEIGHT: 64 IN | HEART RATE: 71 BPM

## 2021-11-23 DIAGNOSIS — M79.18 MYOFASCIAL PAIN SYNDROME: Primary | ICD-10-CM

## 2021-11-23 DIAGNOSIS — M47.816 SPONDYLOSIS OF LUMBAR REGION WITHOUT MYELOPATHY OR RADICULOPATHY: ICD-10-CM

## 2021-11-23 DIAGNOSIS — M53.3 SACROILIAC JOINT DYSFUNCTION OF LEFT SIDE: ICD-10-CM

## 2021-11-23 DIAGNOSIS — G57.02 PIRIFORMIS SYNDROME OF LEFT SIDE: ICD-10-CM

## 2021-11-23 PROCEDURE — 25010000002 MIDAZOLAM PER 1 MG: Performed by: RADIOLOGY

## 2021-11-23 PROCEDURE — 72148 MRI LUMBAR SPINE W/O DYE: CPT

## 2021-11-23 PROCEDURE — 25010000002 HYDROMORPHONE HCL PF 2 MG/ML SOLUTION: Performed by: RADIOLOGY

## 2021-11-23 PROCEDURE — 72195 MRI PELVIS W/O DYE: CPT

## 2021-11-23 RX ORDER — FENTANYL CITRATE 50 UG/ML
INJECTION, SOLUTION INTRAMUSCULAR; INTRAVENOUS
Status: DISCONTINUED
Start: 2021-11-23 | End: 2021-11-23 | Stop reason: WASHOUT

## 2021-11-23 RX ORDER — HYDROMORPHONE HYDROCHLORIDE 2 MG/ML
INJECTION, SOLUTION INTRAMUSCULAR; INTRAVENOUS; SUBCUTANEOUS
Status: COMPLETED | OUTPATIENT
Start: 2021-11-23 | End: 2021-11-23

## 2021-11-23 RX ORDER — MIDAZOLAM HYDROCHLORIDE 1 MG/ML
INJECTION INTRAMUSCULAR; INTRAVENOUS
Status: COMPLETED | OUTPATIENT
Start: 2021-11-23 | End: 2021-11-23

## 2021-11-23 RX ORDER — ALPRAZOLAM 0.5 MG/1
0.5 TABLET ORAL
COMMUNITY
Start: 2021-11-18 | End: 2022-02-03

## 2021-11-23 RX ORDER — MIDAZOLAM HYDROCHLORIDE 1 MG/ML
INJECTION INTRAMUSCULAR; INTRAVENOUS
Status: DISPENSED
Start: 2021-11-23 | End: 2021-11-23

## 2021-11-23 RX ORDER — SODIUM CHLORIDE 0.9 % (FLUSH) 0.9 %
10 SYRINGE (ML) INJECTION AS NEEDED
Status: DISCONTINUED | OUTPATIENT
Start: 2021-11-23 | End: 2021-11-24 | Stop reason: HOSPADM

## 2021-11-23 RX ORDER — SODIUM CHLORIDE 0.9 % (FLUSH) 0.9 %
3 SYRINGE (ML) INJECTION EVERY 12 HOURS SCHEDULED
Status: DISCONTINUED | OUTPATIENT
Start: 2021-11-23 | End: 2021-11-24 | Stop reason: HOSPADM

## 2021-11-23 RX ADMIN — MIDAZOLAM HYDROCHLORIDE 2 MG: 1 INJECTION, SOLUTION INTRAMUSCULAR; INTRAVENOUS at 10:35

## 2021-11-23 RX ADMIN — HYDROMORPHONE HYDROCHLORIDE 1 MG: 2 INJECTION, SOLUTION INTRAMUSCULAR; INTRAVENOUS; SUBCUTANEOUS at 09:44

## 2021-11-23 RX ADMIN — MIDAZOLAM HYDROCHLORIDE 2 MG: 1 INJECTION, SOLUTION INTRAMUSCULAR; INTRAVENOUS at 09:45

## 2021-11-23 NOTE — NURSING NOTE
Patient placed on cardiac monitors, vitals stable. Consents verified. Patient placed in position with images completed and reviewed by physician. Timeout completed. Medications given as ordered. An MRI with sedation was completed, patient tolerated well. Report called to RN. Total medications given: Versed 4 mg; Dilaudid 1 mg.

## 2021-11-23 NOTE — NURSING NOTE
Pt discharged from ir dept s/p mri with sedation. Pt reports that she remained awake throughout the entire scan but was able to tolerate the procedure by praying, concentrating on her [employer] music, and doing deep breathing exercises. Pt is drowsy but awake and verbalizing her readiness for discharge. Discharge instructions reviewed with patient and spouse, both report are in medical field and are aware of her recovery needs. Pt transported to exit via wheelchair per CNA.

## 2021-11-24 DIAGNOSIS — M51.37 DEGENERATION OF LUMBAR OR LUMBOSACRAL INTERVERTEBRAL DISC: Primary | ICD-10-CM

## 2021-11-29 ENCOUNTER — OUTSIDE FACILITY SERVICE (OUTPATIENT)
Dept: PAIN MEDICINE | Facility: CLINIC | Age: 51
End: 2021-11-29

## 2021-11-29 PROCEDURE — 27096 INJECT SACROILIAC JOINT: CPT | Performed by: ANESTHESIOLOGY

## 2021-11-29 PROCEDURE — 99152 MOD SED SAME PHYS/QHP 5/>YRS: CPT | Performed by: ANESTHESIOLOGY

## 2021-11-30 ENCOUNTER — TELEPHONE (OUTPATIENT)
Dept: PAIN MEDICINE | Facility: CLINIC | Age: 51
End: 2021-11-30

## 2021-11-30 NOTE — TELEPHONE ENCOUNTER
Spoke with patient. Advised of appointment with Dr. Cash. 12/23 @ [address], [place] [address]  [phone].     Patient reports that she is doing well from her procedure yesterday. No other needs expressed at this time.

## 2021-11-30 NOTE — TELEPHONE ENCOUNTER
Provider: DR GUEVARA   Caller: PRISCILLA HIGH   Relationship to Patient: SELF   Phone Number: [phone]   Reason for Call: PATIENT CALLED AND WAS TRYING TO RETURN A CALL TO JANAK   PLEASE ADVISE

## 2021-12-15 ENCOUNTER — APPOINTMENT (OUTPATIENT)
Dept: MAMMOGRAPHY | Facility: HOSPITAL | Age: 51
End: 2021-12-15

## 2022-01-24 ENCOUNTER — APPOINTMENT (OUTPATIENT)
Dept: NEUROLOGY | Facility: HOSPITAL | Age: 52
End: 2022-01-24

## 2022-02-03 ENCOUNTER — OFFICE VISIT (OUTPATIENT)
Dept: FAMILY MEDICINE CLINIC | Facility: CLINIC | Age: 52
End: 2022-02-03

## 2022-02-03 VITALS
BODY MASS INDEX: 31.99 KG/M2 | OXYGEN SATURATION: 97 % | HEART RATE: 73 BPM | RESPIRATION RATE: 18 BRPM | DIASTOLIC BLOOD PRESSURE: 88 MMHG | TEMPERATURE: 97.5 F | HEIGHT: 64 IN | WEIGHT: 187.4 LBS | SYSTOLIC BLOOD PRESSURE: 126 MMHG

## 2022-02-03 DIAGNOSIS — F39 MOOD DISORDER: Primary | ICD-10-CM

## 2022-02-03 DIAGNOSIS — R63.5 WEIGHT GAIN: ICD-10-CM

## 2022-02-03 DIAGNOSIS — E78.00 HIGH CHOLESTEROL: ICD-10-CM

## 2022-02-03 DIAGNOSIS — E66.9 OBESITY (BMI 30.0-34.9): ICD-10-CM

## 2022-02-03 PROCEDURE — 99214 OFFICE O/P EST MOD 30 MIN: CPT | Performed by: FAMILY MEDICINE

## 2022-02-03 RX ORDER — GABAPENTIN 100 MG/1
100 CAPSULE ORAL 3 TIMES DAILY
COMMUNITY
Start: 2022-01-28 | End: 2022-05-16

## 2022-02-03 RX ORDER — PHENTERMINE HYDROCHLORIDE 37.5 MG/1
TABLET ORAL
Qty: 30 TABLET | Refills: 1 | Status: SHIPPED | OUTPATIENT
Start: 2022-02-03

## 2022-02-03 RX ORDER — BUPROPION HYDROCHLORIDE 150 MG/1
150 TABLET ORAL DAILY
Qty: 90 TABLET | Refills: 3 | Status: SHIPPED | OUTPATIENT
Start: 2022-02-03 | End: 2022-06-21 | Stop reason: SDUPTHER

## 2022-02-03 NOTE — PROGRESS NOTES
"Chief Complaint  Follow-up (medication)    Subjective          Santa Rojas presents to [facility]  History of Present Illness  Mood has improved with Wellbutrin   Reports no side effect associated with the medication.     She has had weight gain over the last few months  Since Oct 2021, she has been limited due to right foot pain and left hip pain.   Has taken phentermine in the past and helped with weight loss.     She saw podiatry for chronic right foot pain.   Dr. Puckett at  performed Right Foot Tarsal Tunnel Release on 1/5/22.  She is now weight bearing with walking boot. She is in physical therapy.     Answers for HPI/ROS submitted by the patient on 2/3/2022  Please describe your symptoms.: Follow up for medication. I’m doing good. , , , The questions below don’t  apply so I answered both just so i could continue.  Have you had these symptoms before?: Yes  How long have you been having these symptoms?: Greater than 2 weeks  Please list any medications you are currently taking for this condition.: Wellbutrin  Please describe any probable cause for these symptoms. : N/A  What is the primary reason for your visit?: Other      The following portions of the patient's history were reviewed and updated as appropriate: allergies, current medications, past family history, past medical history, past social history, past surgical history and problem list.    Objective   Vital Signs:   /88   Pulse 73   Temp 97.5 °F (36.4 °C)   Resp 18   Ht 162.6 cm (64.02\")   Wt 85 kg (187 lb 6.4 oz)   SpO2 97%   BMI 32.15 kg/m²     Physical Exam  Vitals and nursing note reviewed.   Constitutional:       Appearance: She is well-developed.   HENT:      Head: Normocephalic and atraumatic.      Right Ear: External ear normal.      Left Ear: External ear normal.      Nose: Nose normal.   Eyes:      Conjunctiva/sclera: Conjunctivae normal.   Cardiovascular:      Rate and Rhythm: Normal rate " and regular rhythm.      Heart sounds: Normal heart sounds. No murmur heard.      Pulmonary:      Effort: Pulmonary effort is normal.      Breath sounds: Normal breath sounds. No wheezing.   Musculoskeletal:         General: No deformity.      Cervical back: Normal range of motion and neck supple.      Comments: Walking boot right foot   Skin:     General: Skin is warm and dry.   Neurological:      Mental Status: She is alert and oriented to person, place, and time.   Psychiatric:         Behavior: Behavior normal.        Result Review :                 Assessment and Plan    Diagnoses and all orders for this visit:    1. Mood disorder (HCC) (Primary)  -     buPROPion XL (Wellbutrin XL) 150 MG 24 hr tablet; Take 1 tablet by mouth Daily.  Dispense: 90 tablet; Refill: 3  -     Comprehensive Metabolic Panel; Future    2. Weight gain  -     phentermine (ADIPEX-P) 37.5 MG tablet; Start 1/2 tab po qAM x 4 days, then increase to 1 tab po qAM  Dispense: 30 tablet; Refill: 1    3. Obesity (BMI 30.0-34.9)  -     phentermine (ADIPEX-P) 37.5 MG tablet; Start 1/2 tab po qAM x 4 days, then increase to 1 tab po qAM  Dispense: 30 tablet; Refill: 1    4. High cholesterol  -     Lipid Panel With / Chol / HDL Ratio; Future  -     Comprehensive Metabolic Panel; Future    Overall, she is doing well. Recovering from recent surgery  Phentermine to help with weight loss efforts  She will return to complete FLP to monitor cholesterol, since it was elevated before. She will work on diet and weight loss, complete labs in a few months.       Follow Up   Return in about 6 months (around 8/3/2022) for Recheck.  Patient was given instructions and counseling regarding her condition or for health maintenance advice. Please see specific information pulled into the AVS if appropriate.

## 2022-02-28 ENCOUNTER — HOSPITAL ENCOUNTER (OUTPATIENT)
Dept: MAMMOGRAPHY | Facility: HOSPITAL | Age: 52
Discharge: HOME | End: 2022-02-28
Admitting: OBSTETRICS & GYNECOLOGY

## 2022-02-28 DIAGNOSIS — Z12.31 VISIT FOR SCREENING MAMMOGRAM: ICD-10-CM

## 2022-02-28 PROCEDURE — 77067 SCR MAMMO BI INCL CAD: CPT

## 2022-02-28 PROCEDURE — 77063 BREAST TOMOSYNTHESIS BI: CPT | Performed by: RADIOLOGY

## 2022-02-28 PROCEDURE — 77067 SCR MAMMO BI INCL CAD: CPT | Performed by: RADIOLOGY

## 2022-02-28 PROCEDURE — 77063 BREAST TOMOSYNTHESIS BI: CPT

## 2022-03-02 DIAGNOSIS — M79.2 NEUROPATHIC PAIN OF LEFT LOWER EXTREMITY: Primary | ICD-10-CM

## 2022-03-02 RX ORDER — GABAPENTIN 600 MG/1
TABLET, FILM COATED ORAL
Qty: 60 TABLET | Refills: 1 | Status: SHIPPED | OUTPATIENT
Start: 2022-03-02 | End: 2022-05-16 | Stop reason: SDUPTHER

## 2022-03-11 DIAGNOSIS — F40.243 ANXIETY WITH FLYING: Primary | ICD-10-CM

## 2022-03-11 RX ORDER — ALPRAZOLAM 0.5 MG/1
0.5 TABLET ORAL 2 TIMES DAILY PRN
Qty: 10 TABLET | Refills: 0 | Status: SHIPPED | OUTPATIENT
Start: 2022-03-11

## 2022-04-25 ENCOUNTER — TELEPHONE (OUTPATIENT)
Dept: PAIN MEDICINE | Facility: CLINIC | Age: 52
End: 2022-04-25

## 2022-04-25 DIAGNOSIS — G57.02 PIRIFORMIS SYNDROME OF LEFT SIDE: Primary | ICD-10-CM

## 2022-04-25 DIAGNOSIS — M79.2 NEUROPATHIC PAIN OF LEFT LOWER EXTREMITY: ICD-10-CM

## 2022-04-25 DIAGNOSIS — M51.37 DEGENERATION OF LUMBAR OR LUMBOSACRAL INTERVERTEBRAL DISC: ICD-10-CM

## 2022-04-25 NOTE — TELEPHONE ENCOUNTER
Caller: PATIENT     Relationship: SELF     Best call back number:  [phone]    What orders are you requesting (i.e. lab or imaging): PHYSICAL THERAPY ORDER     In what timeframe would the patient need to come in:      Where will you receive your lab/imaging services: [place], [place]     Additional notes:  PT. NEEDS NEW ORDER FOR PHYSICAL THERAPY FOR PIRIFORMIS SYNDROME. STATES THAT SHE MISPLACED THE ONE THAT SHE HAD.   SHE IS GOING TO PHYSICAL THERAPY OFFICE CLOSE TO HOME IN [place], [place].   ASKING IF AN ORDER CAN BE EMAILED TO HER EMAIL ON FILE.   PLEASE CALL TO LET HER KNOW.

## 2022-05-16 ENCOUNTER — DOCUMENTATION (OUTPATIENT)
Dept: PAIN MEDICINE | Facility: CLINIC | Age: 52
End: 2022-05-16

## 2022-05-16 DIAGNOSIS — M79.2 NEUROPATHIC PAIN OF LEFT LOWER EXTREMITY: ICD-10-CM

## 2022-05-16 RX ORDER — GABAPENTIN 600 MG/1
TABLET, FILM COATED ORAL
Qty: 60 TABLET | Refills: 1 | Status: SHIPPED | OUTPATIENT
Start: 2022-05-16 | End: 2022-06-20 | Stop reason: SDUPTHER

## 2022-05-16 NOTE — TELEPHONE ENCOUNTER
Rx Refill Note  Requested Prescriptions     Pending Prescriptions Disp Refills   • gabapentin, once-daily, (Gralise) 600 MG tablet tablet 60 tablet 1     Sig: Take 2 tablets after dinner, or a snack before bedtime (do not crush or split tablet)      Last office visit with prescribing clinician: 4/27/2021      Next office visit with prescribing clinician: Visit date not found            Yamileth Mathew RN  05/16/22, 08:01 EDT

## 2022-05-16 NOTE — PROGRESS NOTES
Prior auth completed and faxed to [facility].   Approval received for Gralise 600mmg BID 05/16/2022-05/16/2023

## 2022-06-14 ENCOUNTER — TELEPHONE (OUTPATIENT)
Dept: FAMILY MEDICINE CLINIC | Facility: CLINIC | Age: 52
End: 2022-06-14

## 2022-06-14 NOTE — TELEPHONE ENCOUNTER
Caller: Santa Rojas    Relationship: Self    Best call back number: [phone]    What orders are you requesting (i.e. lab or imaging): TDAP VACCINE    In what timeframe would the patient need to come in: ANYTIME

## 2022-06-20 DIAGNOSIS — M79.2 NEUROPATHIC PAIN OF LEFT LOWER EXTREMITY: ICD-10-CM

## 2022-06-20 RX ORDER — GABAPENTIN 600 MG/1
TABLET, FILM COATED ORAL
Qty: 60 TABLET | Refills: 1 | Status: SHIPPED | OUTPATIENT
Start: 2022-06-20 | End: 2022-11-01

## 2022-06-21 DIAGNOSIS — F39 MOOD DISORDER: ICD-10-CM

## 2022-06-21 RX ORDER — BUPROPION HYDROCHLORIDE 150 MG/1
150 TABLET ORAL DAILY
Qty: 90 TABLET | Refills: 3 | Status: SHIPPED | OUTPATIENT
Start: 2022-06-21

## 2022-09-16 NOTE — TELEPHONE ENCOUNTER
Pt called about her appointment at 6/2/2021. Advised pt that she is on the schedule.    [FreeTextEntry2] : Pt seen by A Hoang NICE, I was covering MD, agree with the above assessment and plan.\par

## 2022-09-26 ENCOUNTER — LAB (OUTPATIENT)
Dept: LAB | Facility: HOSPITAL | Age: 52
End: 2022-09-26

## 2022-09-26 ENCOUNTER — TRANSCRIBE ORDERS (OUTPATIENT)
Dept: LAB | Facility: HOSPITAL | Age: 52
End: 2022-09-26

## 2022-09-26 DIAGNOSIS — Z01.812 PRE-OPERATIVE LABORATORY EXAMINATION: ICD-10-CM

## 2022-09-26 DIAGNOSIS — Z01.812 PRE-OPERATIVE LABORATORY EXAMINATION: Primary | ICD-10-CM

## 2022-09-26 LAB
HCT VFR BLD AUTO: 41.5 % (ref 34–46.6)
HGB BLD-MCNC: 13.3 G/DL (ref 12–15.9)
POTASSIUM SERPL-SCNC: 3.8 MMOL/L (ref 3.5–5.2)

## 2022-09-26 PROCEDURE — 36415 COLL VENOUS BLD VENIPUNCTURE: CPT

## 2022-09-26 PROCEDURE — 85014 HEMATOCRIT: CPT

## 2022-09-26 PROCEDURE — 84132 ASSAY OF SERUM POTASSIUM: CPT

## 2022-09-26 PROCEDURE — 85018 HEMOGLOBIN: CPT

## 2022-10-31 DIAGNOSIS — M79.2 NEUROPATHIC PAIN OF LEFT LOWER EXTREMITY: ICD-10-CM

## 2022-11-01 RX ORDER — GABAPENTIN 600 MG/1
TABLET, FILM COATED ORAL
Qty: 60 TABLET | Refills: 11 | Status: SHIPPED | OUTPATIENT
Start: 2022-11-01 | End: 2023-04-02

## 2023-03-25 ENCOUNTER — NURSE TRIAGE (OUTPATIENT)
Dept: CALL CENTER | Facility: HOSPITAL | Age: 53
End: 2023-03-25
Payer: COMMERCIAL

## 2023-03-25 NOTE — TELEPHONE ENCOUNTER
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 Spoke with this provider who feels she is having gallbladder issues due to intermittent epigastric pain which sometimes radiates to right shoulder and upper back and feels she is puffy in mid epigastric region. Denies fever, states pain worsens with eating. Requesting call back from Dr. Liu as she is requesting labs to be ordered for today, LFTs, Lipase, CBC, Chem 7.    Will secure chat Dr. Rodriges, who is on call.   Awaiting response.    1028 - Response received. Provider recommends ER to evaluate as she cannot ordertesting without seeing patient. Caller notified and verbalized understanding.  Reason for Disposition  • [1] MILD-MODERATE pain AND [2] not relieved by antacids    Additional Information  • Negative: [1] Abdominal pain, moderate-severe AND [2] upper  • Negative: [1] Abdominal pain AND [2] pregnant 20 or more weeks  • Negative: [1] Abdominal pain AND [2] pregnant < 20 weeks  • Negative: [1] Abdominal pain, moderate-severe AND [2] female  • Negative: [1] Abdominal pain, moderate-severe AND [2] male  • Negative: [1] Pregnant < 20 Weeks AND [2] nausea/vomiting began in early pregnancy (i.e., 4-8 weeks pregnant)  • Negative: Vomiting  • Negative: Diarrhea  • [1] Abdominal pain, mild AND [2] female  • Negative: Shock suspected (e.g., cold/pale/clammy skin, too weak to stand, low BP, rapid pulse)  • Negative: Difficult to awaken or acting confused (e.g., disoriented, slurred speech)  • Negative: Passed out (i.e., lost consciousness, collapsed and was not responding)  • Negative: Sounds like a life-threatening emergency to the triager  • Negative: Chest pain  • Pain is mainly in upper abdomen  (if needed ask: \"is it mainly above the belly button?\")  • Negative: SEVERE difficulty breathing (e.g., struggling for each breath, speaks in single words)  • Negative: Shock suspected (e.g., cold/pale/clammy skin, too weak to " "stand, low BP, rapid pulse)  • Negative: Difficult to awaken or acting confused (e.g., disoriented, slurred speech)  • Negative: Passed out (i.e., lost consciousness, collapsed and was not responding)  • Negative: Visible sweat on face or sweat dripping down face  • Negative: Sounds like a life-threatening emergency to the triager  • Negative: Followed an abdomen (stomach) injury  • Negative: Chest pain  • Negative: [1] SEVERE pain (e.g., excruciating) AND [2] present > 1 hour  • Negative: [1] Pain lasts > 10 minutes AND [2] age > 50  • Negative: [1] Pain lasts > 10 minutes AND [2] age > 40 AND [3] associated chest, arm, neck, upper back or jaw pain  • Negative: [1] Pain lasts > 10 minutes AND [2] age > 35 AND [3] at least one cardiac risk factor (i.e., hypertension, diabetes, obesity, smoker or strong family history of heart disease)  • Negative: [1] Pain lasts > 10 minutes AND [2] history of heart disease (i.e., heart attack, bypass surgery, angina, angioplasty, CHF; not just a heart murmur)  • Negative: [1] Pain lasts > 10 minutes AND [2] difficulty breathing  • Negative: [1] Vomiting AND [2] contains red blood  (Exception: few streaks and only occurred once)  • Negative: [1] Vomiting AND [2] contains black (\"coffee ground\") material  • Negative: Blood in bowel movements  (Exception: Blood on surface of BM with constipation)  • Negative: Black or tarry bowel movements (Exception: chronic-unchanged black-grey bowel movements AND is taking iron pills or Pepto-bismol)  • Negative: [1] Pregnant > 24 weeks AND [2] hand or face swelling  • Negative: Patient sounds very sick or weak to the triager  • Negative: [1] MILD-MODERATE pain AND [2] constant AND [3] present > 2 hours    Answer Assessment - Initial Assessment Questions  1. LOCATION: \"Where does it hurt?\"       Mid epigastric pain  2. RADIATION: \"Does the pain shoot anywhere else?\" (e.g., chest, back)      Right shoulder and back  3. ONSET: \"When did the pain " "begin?\" (e.g., minutes, hours or days ago)       Over last week  4. SUDDEN: \"Gradual or sudden onset?\"      [place];  5. PATTERN \"Does the pain come and go, or is it constant?\"     - If constant: \"Is it getting better, staying the same, or worsening?\"       (Note: Constant means the pain never goes away completely; most serious pain is constant and it progresses)      - If intermittent: \"How long does it last?\" \"Do you have pain now?\"      (Note: Intermittent means the pain goes away completely between bouts)     Comes and goes  6. SEVERITY: \"How bad is the pain?\"  (e.g., Scale 1-10; mild, moderate, or severe)    - MILD (1-3): doesn't interfere with normal activities, abdomen soft and not tender to touch     - MODERATE (4-7): interferes with normal activities or awakens from sleep, tender to touch     - SEVERE (8-10): excruciating pain, doubled over, unable to do any normal activities       Mild to moderate  7. RECURRENT SYMPTOM: \"Have you ever had this type of stomach pain before?\" If Yes, ask: \"When was the last time?\" and \"What happened that time?\"       Yes. States was seen by provider for possible gallbladder issues. No scans or labs drawn  8. CAUSE: \"What do you think is causing the stomach pain?\"      Gallbladder  9. RELIEVING/AGGRAVATING FACTORS: \"What makes it better or worse?\" (e.g., movement, antacids, bowel movement)      Nothing  10. OTHER SYMPTOMS: \"Has there been any vomiting, diarrhea, constipation, or urine problems?\"        \"puffy in epigastric region  11. PREGNANCY: \"Is there any chance you are pregnant?\" \"When was your last menstrual period?\"        No    Answer Assessment - Initial Assessment Questions  1. LOCATION: \"Where does it hurt?\"       See previous  2. RADIATION: \"Does the pain shoot anywhere else?\" (e.g., chest, back)      Right shoulder and back  3. ONSET: \"When did the pain begin?\" (e.g., minutes, hours or days ago)       One week  4. SUDDEN: \"Gradual or sudden onset?\"      " "Gradual  5. PATTERN \"Does the pain come and go, or is it constant?\"     - If constant: \"Is it getting better, staying the same, or worsening?\"       (Note: Constant means the pain never goes away completely; most serious pain is constant and it progresses)      - If intermittent: \"How long does it last?\" \"Do you have pain now?\"      (Note: Intermittent means the pain goes away completely between bouts)     Comes and goes  6. SEVERITY: \"How bad is the pain?\"  (e.g., Scale 1-10; mild, moderate, or severe)     - MILD (1-3): doesn't interfere with normal activities, abdomen soft and not tender to touch      - MODERATE (4-7): interferes with normal activities or awakens from sleep, tender to touch      - SEVERE (8-10): excruciating pain, doubled over, unable to do any normal activities       Mild to moderate  7. RECURRENT SYMPTOM: \"Have you ever had this type of stomach pain before?\" If Yes, ask: \"When was the last time?\" and \"What happened that time?\"      Yes  8. AGGRAVATING FACTORS: \"Does anything seem to cause this pain?\" (e.g., foods, stress, alcohol)      Food  9. CARDIAC SYMPTOMS: \"Do you have any of the following symptoms: chest pain, difficulty breathing, sweating, nausea?\"      No  10. OTHER SYMPTOMS: \"Do you have any other symptoms?\" (e.g., fever, vomiting, diarrhea)        No  11. PREGNANCY: \"Is there any chance you are pregnant?\" \"When was your last menstrual period?\"        No    Protocols used: ABDOMINAL PAIN - UPPER-ADULT-AH, GI MULTIPLE SYMPTOMS - GUIDELINE SELECTION-ADULT-AH, ABDOMINAL PAIN - FEMALE-ADULT-AH      "

## 2023-03-27 ENCOUNTER — TELEPHONE (OUTPATIENT)
Dept: GASTROENTEROLOGY | Facility: CLINIC | Age: 53
End: 2023-03-27
Payer: COMMERCIAL

## 2023-03-27 ENCOUNTER — TELEPHONE (OUTPATIENT)
Dept: FAMILY MEDICINE CLINIC | Facility: CLINIC | Age: 53
End: 2023-03-27
Payer: COMMERCIAL

## 2023-03-27 DIAGNOSIS — Z13.29 THYROID DISORDER SCREENING: ICD-10-CM

## 2023-03-27 DIAGNOSIS — E78.00 HIGH CHOLESTEROL: Primary | ICD-10-CM

## 2023-03-27 DIAGNOSIS — R10.11 RIGHT UPPER QUADRANT PAIN: Primary | ICD-10-CM

## 2023-03-27 NOTE — TELEPHONE ENCOUNTER
Caller: Santa Rojas    Relationship: Self    Best call back number: [phone]    What orders are you requesting (i.e. lab or imaging): LAB WORK     In what timeframe would the patient need to come in: BEFORE PATIENTS APPOINTMENT ON 4.10.23    Where will you receive your lab/imaging services:      Additional notes: PATIENT HAS AN APPOINTMENT AND WOULD LIKE BLOOD WORK DONE BEFOREHAND. PATENT STATES IT HAS BEEN A WHILE SINCE SHE'S HAD SOME BLOOD WORK DONE.

## 2023-03-28 ENCOUNTER — HOSPITAL ENCOUNTER (OUTPATIENT)
Dept: ULTRASOUND IMAGING | Facility: HOSPITAL | Age: 53
Discharge: HOME | End: 2023-03-28
Admitting: INTERNAL MEDICINE
Payer: COMMERCIAL

## 2023-03-28 DIAGNOSIS — R10.11 RIGHT UPPER QUADRANT PAIN: ICD-10-CM

## 2023-03-28 PROCEDURE — 76705 ECHO EXAM OF ABDOMEN: CPT

## 2023-03-29 ENCOUNTER — TELEPHONE (OUTPATIENT)
Dept: GASTROENTEROLOGY | Facility: CLINIC | Age: 53
End: 2023-03-29
Payer: COMMERCIAL

## 2023-03-29 NOTE — TELEPHONE ENCOUNTER
----- Message from Sedrick Jauregui MD sent at 3/28/2023  3:06 PM EDT -----  I notified Santa that her ultrasound was normal.

## 2023-04-07 ENCOUNTER — LAB (OUTPATIENT)
Dept: LAB | Facility: HOSPITAL | Age: 53
End: 2023-04-07
Payer: COMMERCIAL

## 2023-04-07 PROCEDURE — 80050 GENERAL HEALTH PANEL: CPT | Performed by: FAMILY MEDICINE

## 2023-04-07 PROCEDURE — 80061 LIPID PANEL: CPT | Performed by: FAMILY MEDICINE

## 2023-04-10 ENCOUNTER — OFFICE VISIT (OUTPATIENT)
Dept: FAMILY MEDICINE CLINIC | Facility: CLINIC | Age: 53
End: 2023-04-10
Payer: COMMERCIAL

## 2023-04-10 VITALS
SYSTOLIC BLOOD PRESSURE: 138 MMHG | BODY MASS INDEX: 31.65 KG/M2 | OXYGEN SATURATION: 97 % | WEIGHT: 185.4 LBS | HEIGHT: 64 IN | HEART RATE: 70 BPM | TEMPERATURE: 97.3 F | DIASTOLIC BLOOD PRESSURE: 90 MMHG | RESPIRATION RATE: 12 BRPM

## 2023-04-10 DIAGNOSIS — E66.9 OBESITY (BMI 30.0-34.9): ICD-10-CM

## 2023-04-10 DIAGNOSIS — E78.00 HIGH CHOLESTEROL: Primary | ICD-10-CM

## 2023-04-10 DIAGNOSIS — R63.5 WEIGHT GAIN: ICD-10-CM

## 2023-04-10 RX ORDER — AZELASTINE HYDROCHLORIDE 137 UG/1
1 SPRAY, METERED NASAL 2 TIMES DAILY
COMMUNITY
Start: 2023-03-13

## 2023-04-10 RX ORDER — ADAPALENE 3 MG/G
GEL TOPICAL
COMMUNITY

## 2023-04-10 RX ORDER — PHENTERMINE HYDROCHLORIDE 37.5 MG/1
TABLET ORAL
Qty: 30 TABLET | Refills: 1 | Status: SHIPPED | OUTPATIENT
Start: 2023-04-10

## 2023-04-10 NOTE — PROGRESS NOTES
Chief Complaint  Heartburn (Gastric burning after eating, started about 3 weeks ago. Epigastric and right side pain. Better now. She's not sure if she passed a stone. )    Subjective          Santa Rojsa presents to [facility]  History of Present Illness  She had an episode of gastric burning, epigastric, and RUQ pain x 1 week.  Completed gallbladder ultrasound 3/28/23, which was negative for stones and sludge  Thinks that she might have passed a gallstone  Only intermittent acid reflux, took PPI x 1-2 weeks and stopped it.   Symptoms completely resolved.     She would like to refill of phentermine  Has had some weight gain  Wasn't able to be active last year, 2 surgeries on feet. She also had basal cell carcinoma on back removed.  She has recovered and getting more active.     She recently completed labs  Elevated cholesterol, along with HDL elevated. She is working on improving diet and exercise.     The following portions of the patient's history were reviewed and updated as appropriate: allergies, current medications, past family history, past medical history, past social history, past surgical history and problem list.    Objective      Physical Exam  Vitals reviewed.   Constitutional:       Appearance: She is well-developed.   Cardiovascular:      Rate and Rhythm: Normal rate and regular rhythm.      Heart sounds: Normal heart sounds.   Pulmonary:      Effort: Pulmonary effort is normal.      Breath sounds: Normal breath sounds.   Neurological:      Mental Status: She is alert.   Psychiatric:         Behavior: Behavior normal.        Result Review :   The following data was reviewed by: Mavis Rodriges DO on 04/10/2023:  CMP    CMP 9/26/22 4/7/23   Glucose  83   BUN  13   Creatinine  0.79   eGFR  90.7   Sodium  138   Potassium 3.8 3.9   Chloride  99   Calcium  10.5   Total Protein  8.1   Albumin  4.8   Globulin  3.3   Total Bilirubin  0.6   Alkaline Phosphatase  80   AST  (SGOT)  18   ALT (SGPT)  16   Albumin/Globulin Ratio  1.5   BUN/Creatinine Ratio  16.5   Anion Gap  12.6           CBC w/diff    CBC w/Diff 9/26/22 4/7/23   WBC  4.97   RBC  4.64   Hemoglobin 13.3 14.0   Hematocrit 41.5 42.5   MCV  91.6   MCH  30.2   MCHC  32.9   RDW  12.3   Platelets  298   Neutrophil Rel %  59.7   Immature Granulocyte Rel %  0.2   Lymphocyte Rel %  28.6   Monocyte Rel %  8.5   Eosinophil Rel %  2.4   Basophil Rel %  0.6           Lipid Panel    Lipid Panel 4/7/23   Total Cholesterol 254 (A)   Triglycerides 75   HDL Cholesterol 85 (A)   VLDL Cholesterol 12   LDL Cholesterol  157 (A)   (A) Abnormal value            TSH    TSH 4/7/23   TSH 2.190                    Assessment and Plan    Diagnoses and all orders for this visit:    1. High cholesterol (Primary)  Comments:  She would like to avoid medication. She will work on dietary improvement and increasing exercise as tolerated.     2. Weight gain  -     phentermine (ADIPEX-P) 37.5 MG tablet; Start 1/2 tab po qAM x 4 days, then increase to 1 tab po qAM  Dispense: 30 tablet; Refill: 1    3. Obesity (BMI 30.0-34.9)  -     phentermine (ADIPEX-P) 37.5 MG tablet; Start 1/2 tab po qAM x 4 days, then increase to 1 tab po qAM  Dispense: 30 tablet; Refill: 1    Labs from 4/7/23 reviewed with her today in office.       Follow Up   Return in about 1 year (around 4/10/2024) for Annual.  Patient was given instructions and counseling regarding her condition or for health maintenance advice. Please see specific information pulled into the AVS if appropriate.   Answers for HPI/ROS submitted by the patient on 4/10/2023  Please describe your symptoms.: 2-3 weeks ago epigastric pain RUQ pain. Lasted about 5-7 days, No pain currently.  Have you had these symptoms before?: No  How long have you been having these symptoms?: 5-7 days  Please list any medications you are currently taking for this condition.: None now  What is the primary reason for your visit?: Other

## 2023-04-11 DIAGNOSIS — F39 MOOD DISORDER: ICD-10-CM

## 2023-04-11 RX ORDER — BUPROPION HYDROCHLORIDE 150 MG/1
150 TABLET ORAL DAILY
Qty: 90 TABLET | Refills: 3 | Status: SHIPPED | OUTPATIENT
Start: 2023-04-11

## 2023-05-04 DIAGNOSIS — F39 MOOD DISORDER: ICD-10-CM

## 2023-05-05 RX ORDER — BUPROPION HYDROCHLORIDE 150 MG/1
150 TABLET ORAL DAILY
Qty: 90 TABLET | Refills: 3 | OUTPATIENT
Start: 2023-05-05

## 2023-05-18 ENCOUNTER — TELEPHONE (OUTPATIENT)
Dept: FAMILY MEDICINE CLINIC | Facility: CLINIC | Age: 53
End: 2023-05-18
Payer: COMMERCIAL

## 2023-05-18 DIAGNOSIS — F39 MOOD DISORDER: ICD-10-CM

## 2023-05-18 RX ORDER — BUPROPION HYDROCHLORIDE 150 MG/1
150 TABLET ORAL DAILY
Qty: 90 TABLET | Refills: 3 | Status: SHIPPED | OUTPATIENT
Start: 2023-05-18

## 2023-05-18 NOTE — TELEPHONE ENCOUNTER
Lvm, received [facility] request for bupropion XL, just wanted to confirm pt wanted it sent via [facility] before sending.

## 2023-05-18 NOTE — TELEPHONE ENCOUNTER
Patient returned call to Ida, concerning her Bupropion Xl medication. She stated that she wanted the refill to be sent to [facility], she can receive a 90 days supply.

## 2023-07-27 ENCOUNTER — OFFICE VISIT (OUTPATIENT)
Dept: FAMILY MEDICINE CLINIC | Facility: CLINIC | Age: 53
End: 2023-07-27
Payer: COMMERCIAL

## 2023-07-27 VITALS
SYSTOLIC BLOOD PRESSURE: 138 MMHG | HEIGHT: 64 IN | BODY MASS INDEX: 30.56 KG/M2 | DIASTOLIC BLOOD PRESSURE: 96 MMHG | RESPIRATION RATE: 16 BRPM | WEIGHT: 179 LBS | TEMPERATURE: 97.3 F | HEART RATE: 78 BPM | OXYGEN SATURATION: 98 %

## 2023-07-27 DIAGNOSIS — R10.31 RLQ ABDOMINAL PAIN: Primary | ICD-10-CM

## 2023-07-27 LAB
BILIRUB BLD-MCNC: NEGATIVE MG/DL
CLARITY, POC: CLEAR
COLOR UR: YELLOW
GLUCOSE UR STRIP-MCNC: NEGATIVE MG/DL
KETONES UR QL: NEGATIVE
LEUKOCYTE EST, POC: NEGATIVE
NITRITE UR-MCNC: NEGATIVE MG/ML
PH UR: 7 [PH] (ref 5–8)
PROT UR STRIP-MCNC: NEGATIVE MG/DL
RBC # UR STRIP: NEGATIVE /UL
SP GR UR: 1 (ref 1–1.03)
UROBILINOGEN UR QL: NORMAL

## 2023-07-27 PROCEDURE — 99214 OFFICE O/P EST MOD 30 MIN: CPT | Performed by: FAMILY MEDICINE

## 2023-07-27 PROCEDURE — 81002 URINALYSIS NONAUTO W/O SCOPE: CPT | Performed by: FAMILY MEDICINE

## 2023-07-27 RX ORDER — PILOCARPINE HYDROCHLORIDE 10 MG/ML
1 SOLUTION/ DROPS OPHTHALMIC
COMMUNITY
Start: 2023-07-03

## 2023-07-27 NOTE — PROGRESS NOTES
Chief Complaint  Pain (RLQ pain, started 5-6 wks (thinking it's kidney stone or ovarian cyst - has gyn appt in Aug), tender, comes and goes randomly, sometimes radiates pain down into pelvic region. )    Subjective          Santa Rojas presents to [facility]  Abdominal Pain  This is a new problem. The current episode started more than 1 month ago. The onset quality is gradual. The problem occurs 2 to 4 times per day. The most recent episode lasted 2 hours. The problem has been waxing and waning. The pain is located in the RLQ. The pain is at a severity of 2/10. The quality of the pain is aching, dull, a sensation of fullness and sharp. The abdominal pain radiates to the right flank, pelvis and perineum. Associated symptoms include frequency and myalgias. Pertinent negatives include no anorexia, arthralgias, belching, constipation, diarrhea, dysuria, fever, flatus, headaches, hematochezia, hematuria, melena, nausea, vomiting or weight loss. The pain is aggravated by certain positions. The pain is relieved by Nothing and certain positions. double ureter right side     She has appointment with gynecology in August.     The following portions of the patient's history were reviewed and updated as appropriate: allergies, current medications, past family history, past medical history, past social history, past surgical history, and problem list.    Objective      Physical Exam  Vitals reviewed.   Cardiovascular:      Rate and Rhythm: Normal rate.      Heart sounds: Normal heart sounds.   Pulmonary:      Effort: Pulmonary effort is normal.      Breath sounds: Normal breath sounds.   Abdominal:      General: Bowel sounds are normal.      Palpations: Abdomen is soft.      Tenderness: There is abdominal tenderness in the right lower quadrant. There is no guarding.   Neurological:      Mental Status: She is alert.      Result Review :                Assessment and Plan    Diagnoses and all  orders for this visit:    1. RLQ abdominal pain (Primary)  -     POC Urinalysis Dipstick  -     CT Abdomen Pelvis With Contrast    UA normal   CT abd pelvis to evaluate RLQ pain       Follow Up   No follow-ups on file.  Patient was given instructions and counseling regarding her condition or for health maintenance advice. Please see specific information pulled into the AVS if appropriate. Answers submitted by the patient for this visit:  Primary Reason for Visit (Submitted on 7/26/2023)  What is the primary reason for your visit?: Abdominal Pain

## 2023-07-28 ENCOUNTER — TELEPHONE (OUTPATIENT)
Dept: FAMILY MEDICINE CLINIC | Facility: CLINIC | Age: 53
End: 2023-07-28
Payer: COMMERCIAL

## 2023-07-28 NOTE — TELEPHONE ENCOUNTER
[facility] COULD OF GOT PATIENT IN TODAY AT 1230 BUT PATIENT UNABLE TO MAKE APPOINTMENT - I TOLD PATIENT I WOULD CALL FIRST [facility] ON MONDAY MORNING TO SEE IF [facility] HAS ANYTHING FOR STAT. I DID TRY TO CALL [facility] AND THEY STATED THEY DON'T HAVE ANYTHING FOR STATS UNTIL TUESDAY!

## 2023-07-28 NOTE — TELEPHONE ENCOUNTER
Caller: Santa Rojas    Relationship: Self    Best call back number: [phone]     What is the best time to reach you: ANYTIME     Who are you requesting to speak with (clinical staff, provider,  specific staff member): CLINICAL STAFF     Do you know the name of the person who called: THE PATIENT ZOE    What was the call regarding: THE PATIENT REPORTS THAT HER RIGHT LOWER ABDOMINAL IS WORSE TODAY (07/28/2023) THAN WHEN SHE SAW DR VIZCAINO YESTERDAY (07/27/2023) AND IS REQUESTING THE REFERRAL STATUS TO UPDATED TO STAT SO SHE MIGHT GET THE CT SCAN DONE SOONER THAN HER SCHEDULED TIME ON 08/01/2023    Is it okay if the provider responds through [employer]: NO, PLEASE CALL AND ADVISE

## 2023-08-01 ENCOUNTER — HOSPITAL ENCOUNTER (OUTPATIENT)
Dept: CT IMAGING | Facility: HOSPITAL | Age: 53
Discharge: HOME | End: 2023-08-01
Admitting: FAMILY MEDICINE
Payer: COMMERCIAL

## 2023-08-01 PROCEDURE — 25510000001 IOPAMIDOL 61 % SOLUTION: Performed by: FAMILY MEDICINE

## 2023-08-01 PROCEDURE — 74177 CT ABD & PELVIS W/CONTRAST: CPT

## 2023-08-01 RX ADMIN — IOPAMIDOL 90 ML: 612 INJECTION, SOLUTION INTRAVENOUS at 13:48

## 2024-03-25 ENCOUNTER — OFFICE VISIT (OUTPATIENT)
Dept: FAMILY MEDICINE CLINIC | Facility: CLINIC | Age: 54
End: 2024-03-25
Payer: COMMERCIAL

## 2024-03-25 VITALS
WEIGHT: 189.8 LBS | SYSTOLIC BLOOD PRESSURE: 124 MMHG | TEMPERATURE: 97.1 F | DIASTOLIC BLOOD PRESSURE: 84 MMHG | RESPIRATION RATE: 16 BRPM | OXYGEN SATURATION: 100 % | HEART RATE: 72 BPM | BODY MASS INDEX: 32.4 KG/M2 | HEIGHT: 64 IN

## 2024-03-25 DIAGNOSIS — E78.00 HIGH CHOLESTEROL: ICD-10-CM

## 2024-03-25 DIAGNOSIS — E66.9 OBESITY (BMI 30-39.9): Primary | ICD-10-CM

## 2024-03-25 PROCEDURE — 99213 OFFICE O/P EST LOW 20 MIN: CPT | Performed by: FAMILY MEDICINE

## 2024-03-25 RX ORDER — ACETAMINOPHEN AND CODEINE PHOSPHATE 120; 12 MG/5ML; MG/5ML
1 SOLUTION ORAL DAILY
COMMUNITY
Start: 2024-01-25

## 2024-03-25 NOTE — PROGRESS NOTES
Chief Complaint  Follow-up (Shingles f/u, no symptoms currently, seems to have resolved.) and Weight Loss (Started at a weight loss facility, needing letter to be able to use [employer].)    Subjective          Santa Rojas presents to [facility]  History of Present Illness  She recently had oral shingles around right upper lip and tenderness right cheek.   She was on a cruise when this started, was seen and treated on cruise ship. She was treated with acyclovir.     She is starting a 6 weeks program for weight loss, [facility] program  She has already lost 10 lbs.   Requesting a letter for recommendation for diet and exercise regimen. She has had elevated blood pressure, but has improved with her weight loss.     The following portions of the patient's history were reviewed and updated as appropriate: allergies, current medications, past family history, past medical history, past social history, past surgical history, and problem list.    Objective      Physical Exam  Vitals reviewed.   Cardiovascular:      Rate and Rhythm: Normal rate.      Heart sounds: Normal heart sounds.   Pulmonary:      Effort: Pulmonary effort is normal.      Breath sounds: Normal breath sounds.   Neurological:      Mental Status: She is alert.        Result Review :                Assessment and Plan    Diagnoses and all orders for this visit:    1. Obesity (BMI 30-39.9) (Primary)  -     CBC & Differential; Future  -     Comprehensive Metabolic Panel; Future  -     Lipid Panel With / Chol / HDL Ratio; Future  -     TSH; Future    2. High cholesterol  -     CBC & Differential; Future  -     Comprehensive Metabolic Panel; Future  -     Lipid Panel With / Chol / HDL Ratio; Future  -     TSH; Future    3. BMI 32.0-32.9,adult  -     CBC & Differential; Future  -     Comprehensive Metabolic Panel; Future  -     Lipid Panel With / Chol / HDL Ratio; Future  -     TSH; Future        Follow Up   No follow-ups  on file.  Patient was given instructions and counseling regarding her condition or for health maintenance advice. Please see specific information pulled into the AVS if appropriate.

## 2024-03-25 NOTE — LETTER
March 25, 2024     Patient: Santa Rojas   YOB: 1970   Date of Visit: 3/25/2024       To Whom It May Concern:    Mrs. Santa Rojas is currently under my medical care. I am prescribing a diet and exercise regimen for weight loss purposes. She has elevated blood pressure and weight loss would improve this condition.          Sincerely,        Mavis Rodriges,     CC: No Recipients

## 2024-05-24 ENCOUNTER — TELEPHONE (OUTPATIENT)
Dept: FAMILY MEDICINE CLINIC | Facility: CLINIC | Age: 54
End: 2024-05-24

## 2024-05-24 NOTE — TELEPHONE ENCOUNTER
Caller: Santa Rojas    Relationship to patient: Self    Best call back number: [phone]     Chief complaint: HERNIA- PAIN, GROIN AREA     Type of visit: OFFICE    Requested date: ASAP     If rescheduling, when is the original appointment: 07.15.24     Additional notes: THE PATIENT NEEDS THIS APPOINTMENT ASAP. SHE IS ONLY COMFORTABLE SEEING DR. VIZCAINO. PATIENT WAS SCHEDULED FOR FIRST AVAILABLE. SHE WOULD LIKE TO SEE HER ASAP SO SHE CAN GET INTO A SURGEON. THE PATIENT WOULD LIKE TO KNOW IF DR. VIZCAINO CAN SQUEEZE HER IN SOMETIME SOON. PLEASE CALL HER TO LET HER KNOW EITHER WAY.

## 2024-05-30 ENCOUNTER — OFFICE VISIT (OUTPATIENT)
Dept: FAMILY MEDICINE CLINIC | Facility: CLINIC | Age: 54
End: 2024-05-30
Payer: COMMERCIAL

## 2024-05-30 VITALS
HEART RATE: 81 BPM | RESPIRATION RATE: 14 BRPM | DIASTOLIC BLOOD PRESSURE: 84 MMHG | OXYGEN SATURATION: 100 % | SYSTOLIC BLOOD PRESSURE: 138 MMHG | HEIGHT: 64 IN | TEMPERATURE: 97.1 F | WEIGHT: 174 LBS | BODY MASS INDEX: 29.71 KG/M2

## 2024-05-30 DIAGNOSIS — K40.90 INGUINAL HERNIA, RIGHT: Primary | ICD-10-CM

## 2024-05-30 PROCEDURE — 99213 OFFICE O/P EST LOW 20 MIN: CPT | Performed by: FAMILY MEDICINE

## 2024-05-30 NOTE — PROGRESS NOTES
Chief Complaint  Hernia (Tender, x 1year (right grion/))    Subjective          Santa Rojas presents to [facility]    History of Present Illness  The patient presents for evaluation of right lower quadrant pain.    The patient underwent a vaginal ultrasound last year due to right lower quadrant pain, which yielded no significant findings. Despite being on progesterone to manage post-ablation syndrome, she continues to experience discomfort in her groin, particularly when friction is applied. She initially attributed the tenderness to post-ablulation syndrome. However, after engaging in extensive core exercises, she experienced severe pain and noticed a knot in her groin. The hernia was reduced and is currently asymptomatic. The patient has a history of two C-sections and abdominoplasty performed approximately 9 years ago, and believes that part of her stomach is the loosest part of her body. The pain does not radiate, and she expresses a desire to consult with a surgeon, Dr. PULLIAM ([facility]).      The following portions of the patient's history were reviewed and updated as appropriate: allergies, current medications, past family history, past medical history, past social history, past surgical history, and problem list.    Objective      Physical Exam  Vitals reviewed.   Pulmonary:      Effort: Pulmonary effort is normal. No respiratory distress.   Neurological:      Mental Status: She is alert.        Physical Exam      Result Review :       Results               Assessment and Plan    Diagnoses and all orders for this visit:    1. Inguinal hernia, right (Primary)  -     Ambulatory Referral to General Surgery      Assessment & Plan  1. Right lower quadrant pain.  Given the patient's reported discomfort, it is plausible that she has an inguina hernia.   A referral has been made to Dr. PULLIAM for a surgical consultation.        Follow Up   Return if symptoms worsen or fail to  improve.    Patient or patient representative verbalized consent for the use of Ambient Listening during the visit with  Mavis Rodriges DO for chart documentation. 5/30/2024  11:27 EDT  Patient was given instructions and counseling regarding her condition or for health maintenance advice. Please see specific information pulled into the AVS if appropriate.   Answers submitted by the patient for this visit:  Primary Reason for Visit (Submitted on 5/29/2024)  What is the primary reason for your visit?: Abdominal Pain  Abdominal Pain Questionnaire (Submitted on 5/29/2024)  Chief Complaint: Abdominal pain  Chronicity: recurrent  Onset: more than 1 year ago  Onset quality: sudden  Frequency: daily  Progression since onset: worsening  Pain location: LLQ  Pain - numeric: 7/10  Pain quality: burning, dull, sharp  Radiates to: right flank  Aggravated by: bowel movement, movement, palpation, urination  Relieved by: being still, recumbency  Diagnostic workup: CT scan, ultrasound

## 2024-07-01 DIAGNOSIS — Z12.31 ENCOUNTER FOR SCREENING MAMMOGRAM FOR MALIGNANT NEOPLASM OF BREAST: Primary | ICD-10-CM

## 2024-07-12 DIAGNOSIS — E78.00 HIGH CHOLESTEROL: Primary | ICD-10-CM

## 2024-07-12 DIAGNOSIS — E66.9 OBESITY (BMI 30-39.9): ICD-10-CM

## 2024-07-15 ENCOUNTER — LAB (OUTPATIENT)
Facility: HOSPITAL | Age: 54
End: 2024-07-15
Payer: COMMERCIAL

## 2024-07-15 ENCOUNTER — TRANSCRIBE ORDERS (OUTPATIENT)
Facility: HOSPITAL | Age: 54
End: 2024-07-15
Payer: COMMERCIAL

## 2024-07-15 DIAGNOSIS — E78.00 HIGH CHOLESTEROL: ICD-10-CM

## 2024-07-15 DIAGNOSIS — E66.9 OBESITY (BMI 30-39.9): ICD-10-CM

## 2024-07-15 DIAGNOSIS — Z01.812 PRE-OPERATIVE LABORATORY EXAMINATION: ICD-10-CM

## 2024-07-15 DIAGNOSIS — Z01.812 PRE-OPERATIVE LABORATORY EXAMINATION: Primary | ICD-10-CM

## 2024-07-15 LAB
ALBUMIN SERPL-MCNC: 4.1 G/DL (ref 3.5–5.2)
ALBUMIN/GLOB SERPL: 1.5 G/DL
ALP SERPL-CCNC: 76 U/L (ref 39–117)
ALT SERPL W P-5'-P-CCNC: 13 U/L (ref 1–33)
ANION GAP SERPL CALCULATED.3IONS-SCNC: 8 MMOL/L (ref 5–15)
AST SERPL-CCNC: 11 U/L (ref 1–32)
BASOPHILS # BLD AUTO: 0.02 10*3/MM3 (ref 0–0.2)
BASOPHILS NFR BLD AUTO: 0.4 % (ref 0–1.5)
BILIRUB SERPL-MCNC: 0.4 MG/DL (ref 0–1.2)
BUN SERPL-MCNC: 17 MG/DL (ref 6–20)
BUN/CREAT SERPL: 25 (ref 7–25)
CALCIUM SPEC-SCNC: 9.6 MG/DL (ref 8.6–10.5)
CHLORIDE SERPL-SCNC: 105 MMOL/L (ref 98–107)
CO2 SERPL-SCNC: 29 MMOL/L (ref 22–29)
CREAT SERPL-MCNC: 0.68 MG/DL (ref 0.57–1)
DEPRECATED RDW RBC AUTO: 46.6 FL (ref 37–54)
EGFRCR SERPLBLD CKD-EPI 2021: 104.9 ML/MIN/1.73
EOSINOPHIL # BLD AUTO: 0.16 10*3/MM3 (ref 0–0.4)
EOSINOPHIL NFR BLD AUTO: 3.3 % (ref 0.3–6.2)
ERYTHROCYTE [DISTWIDTH] IN BLOOD BY AUTOMATED COUNT: 14 % (ref 12.3–15.4)
GLOBULIN UR ELPH-MCNC: 2.8 GM/DL
GLUCOSE SERPL-MCNC: 95 MG/DL (ref 65–99)
HCT VFR BLD AUTO: 43.5 % (ref 34–46.6)
HGB BLD-MCNC: 14.1 G/DL (ref 12–15.9)
IMM GRANULOCYTES # BLD AUTO: 0.01 10*3/MM3 (ref 0–0.05)
IMM GRANULOCYTES NFR BLD AUTO: 0.2 % (ref 0–0.5)
LYMPHOCYTES # BLD AUTO: 1.76 10*3/MM3 (ref 0.7–3.1)
LYMPHOCYTES NFR BLD AUTO: 36.4 % (ref 19.6–45.3)
MCH RBC QN AUTO: 29.8 PG (ref 26.6–33)
MCHC RBC AUTO-ENTMCNC: 32.4 G/DL (ref 31.5–35.7)
MCV RBC AUTO: 92 FL (ref 79–97)
MONOCYTES # BLD AUTO: 0.48 10*3/MM3 (ref 0.1–0.9)
MONOCYTES NFR BLD AUTO: 9.9 % (ref 5–12)
NEUTROPHILS NFR BLD AUTO: 2.41 10*3/MM3 (ref 1.7–7)
NEUTROPHILS NFR BLD AUTO: 49.8 % (ref 42.7–76)
NRBC BLD AUTO-RTO: 0 /100 WBC (ref 0–0.2)
PLATELET # BLD AUTO: 259 10*3/MM3 (ref 140–450)
PMV BLD AUTO: 10.7 FL (ref 6–12)
POTASSIUM SERPL-SCNC: 4.1 MMOL/L (ref 3.5–5.2)
PROT SERPL-MCNC: 6.9 G/DL (ref 6–8.5)
RBC # BLD AUTO: 4.73 10*6/MM3 (ref 3.77–5.28)
SODIUM SERPL-SCNC: 142 MMOL/L (ref 136–145)
TSH SERPL DL<=0.05 MIU/L-ACNC: 2 UIU/ML (ref 0.27–4.2)
WBC NRBC COR # BLD AUTO: 4.84 10*3/MM3 (ref 3.4–10.8)

## 2024-07-15 PROCEDURE — 80061 LIPID PANEL: CPT

## 2024-07-15 PROCEDURE — 36415 COLL VENOUS BLD VENIPUNCTURE: CPT

## 2024-07-15 PROCEDURE — 80050 GENERAL HEALTH PANEL: CPT

## 2024-07-16 LAB
CHOLEST SERPL-MCNC: 220 MG/DL (ref 100–199)
CHOLEST/HDLC SERPL: 3.3 RATIO (ref 0–4.4)
HDLC SERPL-MCNC: 67 MG/DL
LDLC SERPL CALC-MCNC: 138 MG/DL (ref 0–99)
TRIGL SERPL-MCNC: 84 MG/DL (ref 0–149)
VLDLC SERPL CALC-MCNC: 15 MG/DL (ref 5–40)

## 2024-07-17 ENCOUNTER — TELEPHONE (OUTPATIENT)
Dept: FAMILY MEDICINE CLINIC | Facility: CLINIC | Age: 54
End: 2024-07-17
Payer: COMMERCIAL

## 2024-07-17 ENCOUNTER — ANESTHESIA EVENT (OUTPATIENT)
Dept: PERIOP | Facility: HOSPITAL | Age: 54
End: 2024-07-17
Payer: COMMERCIAL

## 2024-07-17 ENCOUNTER — HOSPITAL ENCOUNTER (OUTPATIENT)
Facility: HOSPITAL | Age: 54
Discharge: HOME | End: 2024-07-17
Admitting: FAMILY MEDICINE
Payer: COMMERCIAL

## 2024-07-17 DIAGNOSIS — Z12.31 ENCOUNTER FOR SCREENING MAMMOGRAM FOR MALIGNANT NEOPLASM OF BREAST: ICD-10-CM

## 2024-07-17 PROCEDURE — 77067 SCR MAMMO BI INCL CAD: CPT

## 2024-07-17 PROCEDURE — 77063 BREAST TOMOSYNTHESIS BI: CPT

## 2024-07-17 RX ORDER — SODIUM CHLORIDE 9 MG/ML
40 INJECTION, SOLUTION INTRAVENOUS AS NEEDED
Status: CANCELLED | OUTPATIENT
Start: 2024-07-17

## 2024-07-17 RX ORDER — SODIUM CHLORIDE 0.9 % (FLUSH) 0.9 %
10 SYRINGE (ML) INJECTION AS NEEDED
Status: CANCELLED | OUTPATIENT
Start: 2024-07-17

## 2024-07-17 RX ORDER — SODIUM CHLORIDE 0.9 % (FLUSH) 0.9 %
10 SYRINGE (ML) INJECTION EVERY 12 HOURS SCHEDULED
Status: CANCELLED | OUTPATIENT
Start: 2024-07-17

## 2024-07-17 RX ORDER — FAMOTIDINE 10 MG/ML
20 INJECTION, SOLUTION INTRAVENOUS ONCE
Status: CANCELLED | OUTPATIENT
Start: 2024-07-17 | End: 2024-07-17

## 2024-07-17 NOTE — TELEPHONE ENCOUNTER
Dr Rodriges noticed the Apolipoprotein B lab had not been drawn.     Contacted [facility] ([phone]) Reina stated that lab was left out accidentally. Lab unable to add since it's a different tube.     I notified pt who's going to come in a few wks to have drawn (she's having sx tmrw)     Dr Rodriges aware. The duplicate labs from 7/12/24 have been deleted.

## 2024-07-18 ENCOUNTER — HOSPITAL ENCOUNTER (OUTPATIENT)
Facility: HOSPITAL | Age: 54
Setting detail: HOSPITAL OUTPATIENT SURGERY
Discharge: HOME | End: 2024-07-18
Attending: SURGERY | Admitting: SURGERY
Payer: COMMERCIAL

## 2024-07-18 ENCOUNTER — ANESTHESIA (OUTPATIENT)
Dept: PERIOP | Facility: HOSPITAL | Age: 54
End: 2024-07-18
Payer: COMMERCIAL

## 2024-07-18 ENCOUNTER — ANESTHESIA EVENT CONVERTED (OUTPATIENT)
Dept: ANESTHESIOLOGY | Facility: HOSPITAL | Age: 54
End: 2024-07-18
Payer: COMMERCIAL

## 2024-07-18 VITALS
WEIGHT: 172 LBS | SYSTOLIC BLOOD PRESSURE: 107 MMHG | HEIGHT: 64 IN | BODY MASS INDEX: 29.37 KG/M2 | DIASTOLIC BLOOD PRESSURE: 85 MMHG | OXYGEN SATURATION: 100 % | TEMPERATURE: 98 F | HEART RATE: 52 BPM | RESPIRATION RATE: 16 BRPM

## 2024-07-18 DIAGNOSIS — K41.90 FEMORAL HERNIA OF RIGHT SIDE: Primary | ICD-10-CM

## 2024-07-18 LAB
ANION GAP SERPL CALCULATED.3IONS-SCNC: 10 MMOL/L (ref 5–15)
B-HCG UR QL: NEGATIVE
BUN SERPL-MCNC: 26 MG/DL (ref 6–20)
BUN/CREAT SERPL: 32.9 (ref 7–25)
CALCIUM SPEC-SCNC: 9.2 MG/DL (ref 8.6–10.5)
CHLORIDE SERPL-SCNC: 102 MMOL/L (ref 98–107)
CO2 SERPL-SCNC: 27 MMOL/L (ref 22–29)
CREAT SERPL-MCNC: 0.79 MG/DL (ref 0.57–1)
DEPRECATED RDW RBC AUTO: 47.8 FL (ref 37–54)
EGFRCR SERPLBLD CKD-EPI 2021: 90.1 ML/MIN/1.73
ERYTHROCYTE [DISTWIDTH] IN BLOOD BY AUTOMATED COUNT: 13.9 % (ref 12.3–15.4)
EXPIRATION DATE: NORMAL
GLUCOSE SERPL-MCNC: 99 MG/DL (ref 65–99)
HCT VFR BLD AUTO: 43.8 % (ref 34–46.6)
HGB BLD-MCNC: 13.8 G/DL (ref 12–15.9)
INTERNAL NEGATIVE CONTROL: NEGATIVE
INTERNAL POSITIVE CONTROL: POSITIVE
Lab: NORMAL
MCH RBC QN AUTO: 29.4 PG (ref 26.6–33)
MCHC RBC AUTO-ENTMCNC: 31.5 G/DL (ref 31.5–35.7)
MCV RBC AUTO: 93.2 FL (ref 79–97)
PLATELET # BLD AUTO: 271 10*3/MM3 (ref 140–450)
PMV BLD AUTO: 10.5 FL (ref 6–12)
POTASSIUM SERPL-SCNC: 3.9 MMOL/L (ref 3.5–5.2)
RBC # BLD AUTO: 4.7 10*6/MM3 (ref 3.77–5.28)
SODIUM SERPL-SCNC: 139 MMOL/L (ref 136–145)
WBC NRBC COR # BLD AUTO: 5.39 10*3/MM3 (ref 3.4–10.8)

## 2024-07-18 PROCEDURE — 25010000002 ONDANSETRON PER 1 MG: Performed by: NURSE ANESTHETIST, CERTIFIED REGISTERED

## 2024-07-18 PROCEDURE — C1781 MESH (IMPLANTABLE): HCPCS | Performed by: SURGERY

## 2024-07-18 PROCEDURE — 25010000002 DEXAMETHASONE SODIUM PHOSPHATE 10 MG/ML SOLUTION: Performed by: NURSE ANESTHETIST, CERTIFIED REGISTERED

## 2024-07-18 PROCEDURE — 25010000002 BUPIVACAINE (PF) 0.25 % SOLUTION: Performed by: NURSE ANESTHETIST, CERTIFIED REGISTERED

## 2024-07-18 PROCEDURE — 25010000002 DEXAMETHASONE PER 1 MG: Performed by: NURSE ANESTHETIST, CERTIFIED REGISTERED

## 2024-07-18 PROCEDURE — 25010000002 DROPERIDOL PER 5 MG

## 2024-07-18 PROCEDURE — 25010000002 SUGAMMADEX 200 MG/2ML SOLUTION: Performed by: NURSE ANESTHETIST, CERTIFIED REGISTERED

## 2024-07-18 PROCEDURE — 81025 URINE PREGNANCY TEST: CPT | Performed by: ANESTHESIOLOGY

## 2024-07-18 PROCEDURE — 80048 BASIC METABOLIC PNL TOTAL CA: CPT | Performed by: SURGERY

## 2024-07-18 PROCEDURE — 25810000003 LACTATED RINGERS PER 1000 ML: Performed by: ANESTHESIOLOGY

## 2024-07-18 PROCEDURE — 25010000002 FENTANYL CITRATE (PF) 100 MCG/2ML SOLUTION: Performed by: NURSE ANESTHETIST, CERTIFIED REGISTERED

## 2024-07-18 PROCEDURE — 25010000002 HYDROMORPHONE 1 MG/ML SOLUTION

## 2024-07-18 PROCEDURE — 25010000002 PROPOFOL 10 MG/ML EMULSION: Performed by: NURSE ANESTHETIST, CERTIFIED REGISTERED

## 2024-07-18 PROCEDURE — 25010000002 FENTANYL CITRATE (PF) 50 MCG/ML SOLUTION

## 2024-07-18 PROCEDURE — 25810000003 LACTATED RINGERS PER 1000 ML: Performed by: NURSE ANESTHETIST, CERTIFIED REGISTERED

## 2024-07-18 PROCEDURE — 85027 COMPLETE CBC AUTOMATED: CPT | Performed by: SURGERY

## 2024-07-18 PROCEDURE — 25010000002 CEFAZOLIN PER 500 MG: Performed by: SURGERY

## 2024-07-18 DEVICE — LIGAMAX 5 MM ENDOSCOPIC MULTIPLE CLIP APPLIER
Type: IMPLANTABLE DEVICE | Site: GROIN | Status: FUNCTIONAL
Brand: LIGAMAX

## 2024-07-18 DEVICE — BARD MESH
Type: IMPLANTABLE DEVICE | Site: GROIN | Status: FUNCTIONAL
Brand: BARD MESH

## 2024-07-18 DEVICE — CAPSURE PERMANENT FIXATION SYSTEM 30 PERMANENT FASTENERS
Type: IMPLANTABLE DEVICE | Site: GROIN | Status: FUNCTIONAL
Brand: CAPSURE PERMANENT FIXATION SYSTEM

## 2024-07-18 RX ORDER — OXYCODONE HYDROCHLORIDE AND ACETAMINOPHEN 5; 325 MG/1; MG/1
1 TABLET ORAL EVERY 4 HOURS PRN
Qty: 17 TABLET | Refills: 0 | Status: SHIPPED | OUTPATIENT
Start: 2024-07-18

## 2024-07-18 RX ORDER — FENTANYL CITRATE 50 UG/ML
50 INJECTION, SOLUTION INTRAMUSCULAR; INTRAVENOUS
Status: DISCONTINUED | OUTPATIENT
Start: 2024-07-18 | End: 2024-07-18 | Stop reason: HOSPADM

## 2024-07-18 RX ORDER — SCOLOPAMINE TRANSDERMAL SYSTEM 1 MG/1
1 PATCH, EXTENDED RELEASE TRANSDERMAL
Status: DISCONTINUED | OUTPATIENT
Start: 2024-07-18 | End: 2024-07-18 | Stop reason: HOSPADM

## 2024-07-18 RX ORDER — NALOXONE HCL 0.4 MG/ML
0.4 VIAL (ML) INJECTION AS NEEDED
Status: DISCONTINUED | OUTPATIENT
Start: 2024-07-18 | End: 2024-07-18 | Stop reason: HOSPADM

## 2024-07-18 RX ORDER — FAMOTIDINE 20 MG/1
20 TABLET, FILM COATED ORAL ONCE
Status: COMPLETED | OUTPATIENT
Start: 2024-07-18 | End: 2024-07-18

## 2024-07-18 RX ORDER — ONDANSETRON 4 MG/1
4 TABLET, FILM COATED ORAL EVERY 8 HOURS PRN
Qty: 30 TABLET | Refills: 0 | Status: SHIPPED | OUTPATIENT
Start: 2024-07-18

## 2024-07-18 RX ORDER — MIDAZOLAM HYDROCHLORIDE 1 MG/ML
1 INJECTION INTRAMUSCULAR; INTRAVENOUS
Status: DISCONTINUED | OUTPATIENT
Start: 2024-07-18 | End: 2024-07-18 | Stop reason: HOSPADM

## 2024-07-18 RX ORDER — BUPIVACAINE HYDROCHLORIDE 2.5 MG/ML
INJECTION, SOLUTION EPIDURAL; INFILTRATION; INTRACAUDAL
Status: COMPLETED | OUTPATIENT
Start: 2024-07-18 | End: 2024-07-18

## 2024-07-18 RX ORDER — HYDROMORPHONE HYDROCHLORIDE 1 MG/ML
0.5 INJECTION, SOLUTION INTRAMUSCULAR; INTRAVENOUS; SUBCUTANEOUS
Status: DISCONTINUED | OUTPATIENT
Start: 2024-07-18 | End: 2024-07-18 | Stop reason: HOSPADM

## 2024-07-18 RX ORDER — HYDROCODONE BITARTRATE AND ACETAMINOPHEN 5; 325 MG/1; MG/1
TABLET ORAL
Status: COMPLETED
Start: 2024-07-18 | End: 2024-07-18

## 2024-07-18 RX ORDER — SODIUM CHLORIDE 9 MG/ML
40 INJECTION, SOLUTION INTRAVENOUS AS NEEDED
Status: DISCONTINUED | OUTPATIENT
Start: 2024-07-18 | End: 2024-07-18 | Stop reason: HOSPADM

## 2024-07-18 RX ORDER — HYDROCODONE BITARTRATE AND ACETAMINOPHEN 5; 325 MG/1; MG/1
1 TABLET ORAL ONCE AS NEEDED
Status: DISCONTINUED | OUTPATIENT
Start: 2024-07-18 | End: 2024-07-18 | Stop reason: HOSPADM

## 2024-07-18 RX ORDER — DROPERIDOL 2.5 MG/ML
INJECTION, SOLUTION INTRAMUSCULAR; INTRAVENOUS
Status: COMPLETED
Start: 2024-07-18 | End: 2024-07-18

## 2024-07-18 RX ORDER — DEXAMETHASONE SODIUM PHOSPHATE 4 MG/ML
INJECTION, SOLUTION INTRA-ARTICULAR; INTRALESIONAL; INTRAMUSCULAR; INTRAVENOUS; SOFT TISSUE AS NEEDED
Status: DISCONTINUED | OUTPATIENT
Start: 2024-07-18 | End: 2024-07-18 | Stop reason: SURG

## 2024-07-18 RX ORDER — DROPERIDOL 2.5 MG/ML
0.62 INJECTION, SOLUTION INTRAMUSCULAR; INTRAVENOUS ONCE AS NEEDED
Status: DISCONTINUED | OUTPATIENT
Start: 2024-07-18 | End: 2024-07-18 | Stop reason: HOSPADM

## 2024-07-18 RX ORDER — PROMETHAZINE HYDROCHLORIDE 25 MG/1
25 TABLET ORAL ONCE AS NEEDED
Status: DISCONTINUED | OUTPATIENT
Start: 2024-07-18 | End: 2024-07-18 | Stop reason: SDUPTHER

## 2024-07-18 RX ORDER — LIDOCAINE HYDROCHLORIDE 10 MG/ML
INJECTION, SOLUTION EPIDURAL; INFILTRATION; INTRACAUDAL; PERINEURAL AS NEEDED
Status: DISCONTINUED | OUTPATIENT
Start: 2024-07-18 | End: 2024-07-18 | Stop reason: SURG

## 2024-07-18 RX ORDER — PROMETHAZINE HYDROCHLORIDE 25 MG/1
12.5 TABLET ORAL ONCE AS NEEDED
Status: DISCONTINUED | OUTPATIENT
Start: 2024-07-18 | End: 2024-07-18 | Stop reason: HOSPADM

## 2024-07-18 RX ORDER — ONDANSETRON 2 MG/ML
INJECTION INTRAMUSCULAR; INTRAVENOUS AS NEEDED
Status: DISCONTINUED | OUTPATIENT
Start: 2024-07-18 | End: 2024-07-18 | Stop reason: SURG

## 2024-07-18 RX ORDER — LIDOCAINE HYDROCHLORIDE 10 MG/ML
0.5 INJECTION, SOLUTION EPIDURAL; INFILTRATION; INTRACAUDAL; PERINEURAL ONCE AS NEEDED
Status: COMPLETED | OUTPATIENT
Start: 2024-07-18 | End: 2024-07-18

## 2024-07-18 RX ORDER — PROMETHAZINE HYDROCHLORIDE 25 MG/1
25 SUPPOSITORY RECTAL ONCE AS NEEDED
Status: DISCONTINUED | OUTPATIENT
Start: 2024-07-18 | End: 2024-07-18 | Stop reason: HOSPADM

## 2024-07-18 RX ORDER — SODIUM CHLORIDE 0.9 % (FLUSH) 0.9 %
3-10 SYRINGE (ML) INJECTION AS NEEDED
Status: DISCONTINUED | OUTPATIENT
Start: 2024-07-18 | End: 2024-07-18 | Stop reason: HOSPADM

## 2024-07-18 RX ORDER — SODIUM CHLORIDE 0.9 % (FLUSH) 0.9 %
3 SYRINGE (ML) INJECTION EVERY 12 HOURS SCHEDULED
Status: DISCONTINUED | OUTPATIENT
Start: 2024-07-18 | End: 2024-07-18 | Stop reason: HOSPADM

## 2024-07-18 RX ORDER — FENTANYL CITRATE 50 UG/ML
INJECTION, SOLUTION INTRAMUSCULAR; INTRAVENOUS AS NEEDED
Status: DISCONTINUED | OUTPATIENT
Start: 2024-07-18 | End: 2024-07-18 | Stop reason: SURG

## 2024-07-18 RX ORDER — DOCUSATE SODIUM 100 MG/1
100 CAPSULE, LIQUID FILLED ORAL ONCE
Status: DISCONTINUED | OUTPATIENT
Start: 2024-07-18 | End: 2024-07-18 | Stop reason: HOSPADM

## 2024-07-18 RX ORDER — MEPERIDINE HYDROCHLORIDE 25 MG/ML
12.5 INJECTION INTRAMUSCULAR; INTRAVENOUS; SUBCUTANEOUS
Status: DISCONTINUED | OUTPATIENT
Start: 2024-07-18 | End: 2024-07-18 | Stop reason: HOSPADM

## 2024-07-18 RX ORDER — HYDRALAZINE HYDROCHLORIDE 20 MG/ML
5 INJECTION INTRAMUSCULAR; INTRAVENOUS
Status: DISCONTINUED | OUTPATIENT
Start: 2024-07-18 | End: 2024-07-18 | Stop reason: HOSPADM

## 2024-07-18 RX ORDER — FENTANYL CITRATE 50 UG/ML
INJECTION, SOLUTION INTRAMUSCULAR; INTRAVENOUS
Status: COMPLETED
Start: 2024-07-18 | End: 2024-07-18

## 2024-07-18 RX ORDER — SODIUM CHLORIDE, SODIUM LACTATE, POTASSIUM CHLORIDE, CALCIUM CHLORIDE 600; 310; 30; 20 MG/100ML; MG/100ML; MG/100ML; MG/100ML
9 INJECTION, SOLUTION INTRAVENOUS CONTINUOUS
Status: DISCONTINUED | OUTPATIENT
Start: 2024-07-18 | End: 2024-07-18 | Stop reason: HOSPADM

## 2024-07-18 RX ORDER — DEXAMETHASONE SODIUM PHOSPHATE 10 MG/ML
INJECTION, SOLUTION INTRAMUSCULAR; INTRAVENOUS
Status: COMPLETED | OUTPATIENT
Start: 2024-07-18 | End: 2024-07-18

## 2024-07-18 RX ORDER — IPRATROPIUM BROMIDE AND ALBUTEROL SULFATE 2.5; .5 MG/3ML; MG/3ML
3 SOLUTION RESPIRATORY (INHALATION) ONCE AS NEEDED
Status: DISCONTINUED | OUTPATIENT
Start: 2024-07-18 | End: 2024-07-18 | Stop reason: HOSPADM

## 2024-07-18 RX ORDER — LABETALOL HYDROCHLORIDE 5 MG/ML
5 INJECTION, SOLUTION INTRAVENOUS
Status: DISCONTINUED | OUTPATIENT
Start: 2024-07-18 | End: 2024-07-18 | Stop reason: HOSPADM

## 2024-07-18 RX ORDER — ONDANSETRON 2 MG/ML
4 INJECTION INTRAMUSCULAR; INTRAVENOUS ONCE AS NEEDED
Status: DISCONTINUED | OUTPATIENT
Start: 2024-07-18 | End: 2024-07-18 | Stop reason: HOSPADM

## 2024-07-18 RX ORDER — SODIUM CHLORIDE, SODIUM LACTATE, POTASSIUM CHLORIDE, CALCIUM CHLORIDE 600; 310; 30; 20 MG/100ML; MG/100ML; MG/100ML; MG/100ML
INJECTION, SOLUTION INTRAVENOUS CONTINUOUS PRN
Status: DISCONTINUED | OUTPATIENT
Start: 2024-07-18 | End: 2024-07-18 | Stop reason: SURG

## 2024-07-18 RX ORDER — ROCURONIUM BROMIDE 10 MG/ML
INJECTION, SOLUTION INTRAVENOUS AS NEEDED
Status: DISCONTINUED | OUTPATIENT
Start: 2024-07-18 | End: 2024-07-18 | Stop reason: SURG

## 2024-07-18 RX ORDER — DOCUSATE SODIUM 250 MG
250 CAPSULE ORAL 2 TIMES DAILY
Qty: 20 CAPSULE | Refills: 0 | Status: SHIPPED | OUTPATIENT
Start: 2024-07-18

## 2024-07-18 RX ORDER — DROPERIDOL 2.5 MG/ML
0.62 INJECTION, SOLUTION INTRAMUSCULAR; INTRAVENOUS
Status: DISCONTINUED | OUTPATIENT
Start: 2024-07-18 | End: 2024-07-18 | Stop reason: HOSPADM

## 2024-07-18 RX ORDER — PROPOFOL 10 MG/ML
VIAL (ML) INTRAVENOUS AS NEEDED
Status: DISCONTINUED | OUTPATIENT
Start: 2024-07-18 | End: 2024-07-18 | Stop reason: SURG

## 2024-07-18 RX ADMIN — DEXAMETHASONE SODIUM PHOSPHATE 8 MG: 4 INJECTION INTRA-ARTICULAR; INTRALESIONAL; INTRAMUSCULAR; INTRAVENOUS; SOFT TISSUE at 07:57

## 2024-07-18 RX ADMIN — LIDOCAINE HYDROCHLORIDE 50 MG: 10 INJECTION, SOLUTION EPIDURAL; INFILTRATION; INTRACAUDAL; PERINEURAL at 07:57

## 2024-07-18 RX ADMIN — SODIUM CHLORIDE, POTASSIUM CHLORIDE, SODIUM LACTATE AND CALCIUM CHLORIDE: 600; 310; 30; 20 INJECTION, SOLUTION INTRAVENOUS at 07:51

## 2024-07-18 RX ADMIN — SODIUM CHLORIDE, POTASSIUM CHLORIDE, SODIUM LACTATE AND CALCIUM CHLORIDE 9 ML/HR: 600; 310; 30; 20 INJECTION, SOLUTION INTRAVENOUS at 06:58

## 2024-07-18 RX ADMIN — DROPERIDOL 0.62 MG: 2.5 INJECTION, SOLUTION INTRAMUSCULAR; INTRAVENOUS at 09:13

## 2024-07-18 RX ADMIN — HYDROCODONE BITARTRATE AND ACETAMINOPHEN 1 TABLET: 5; 325 TABLET ORAL at 09:34

## 2024-07-18 RX ADMIN — FENTANYL CITRATE 50 MCG: 50 INJECTION, SOLUTION INTRAMUSCULAR; INTRAVENOUS at 08:14

## 2024-07-18 RX ADMIN — FENTANYL CITRATE 50 MCG: 50 INJECTION, SOLUTION INTRAMUSCULAR; INTRAVENOUS at 09:20

## 2024-07-18 RX ADMIN — FAMOTIDINE 20 MG: 20 TABLET, FILM COATED ORAL at 06:58

## 2024-07-18 RX ADMIN — SCOPOLAMINE 1 PATCH: 1.5 PATCH, EXTENDED RELEASE TRANSDERMAL at 07:02

## 2024-07-18 RX ADMIN — BUPIVACAINE HYDROCHLORIDE 60 ML: 2.5 INJECTION, SOLUTION EPIDURAL; INFILTRATION; INTRACAUDAL; PERINEURAL at 08:04

## 2024-07-18 RX ADMIN — PROPOFOL 20 MCG/KG/MIN: 10 INJECTION, EMULSION INTRAVENOUS at 08:10

## 2024-07-18 RX ADMIN — PROPOFOL 200 MG: 10 INJECTION, EMULSION INTRAVENOUS at 07:57

## 2024-07-18 RX ADMIN — DEXAMETHASONE SODIUM PHOSPHATE 4 MG: 10 INJECTION, SOLUTION INTRAMUSCULAR; INTRAVENOUS at 08:04

## 2024-07-18 RX ADMIN — HYDROMORPHONE HYDROCHLORIDE 0.5 MG: 1 INJECTION, SOLUTION INTRAMUSCULAR; INTRAVENOUS; SUBCUTANEOUS at 09:13

## 2024-07-18 RX ADMIN — SODIUM CHLORIDE 2000 MG: 900 INJECTION INTRAVENOUS at 08:01

## 2024-07-18 RX ADMIN — PROPOFOL 50 MG: 10 INJECTION, EMULSION INTRAVENOUS at 08:01

## 2024-07-18 RX ADMIN — HYDROMORPHONE HYDROCHLORIDE 0.5 MG: 1 INJECTION, SOLUTION INTRAMUSCULAR; INTRAVENOUS; SUBCUTANEOUS at 09:35

## 2024-07-18 RX ADMIN — LIDOCAINE HYDROCHLORIDE 0.5 ML: 10 INJECTION, SOLUTION EPIDURAL; INFILTRATION; INTRACAUDAL; PERINEURAL at 06:58

## 2024-07-18 RX ADMIN — FENTANYL CITRATE 50 MCG: 50 INJECTION, SOLUTION INTRAMUSCULAR; INTRAVENOUS at 08:23

## 2024-07-18 RX ADMIN — ONDANSETRON 4 MG: 2 INJECTION INTRAMUSCULAR; INTRAVENOUS at 08:45

## 2024-07-18 RX ADMIN — ROCURONIUM BROMIDE 50 MG: 10 INJECTION INTRAVENOUS at 07:57

## 2024-07-18 RX ADMIN — SUGAMMADEX 200 MG: 100 INJECTION, SOLUTION INTRAVENOUS at 08:51

## 2024-07-18 NOTE — OP NOTE
OPERATIVE NOTE    Patient Name:  Santa Rojas  YOB: 1970  [id number]    Date of Surgery:  7/18/2024      PREOPERATIVE DIAGNOSIS: Right femoral hernia       POSTOPERATIVE DIAGNOSIS: Right femoral and obturator hernias       PROCEDURE PERFORMED: Laparoscopic right femoral hernia repair with mesh       SURGEON: Dmitry Edmond MD       ASSISTANT: Assistant: Teresa Marcelino RNFA       SPECIMENS: None        ANESTHESIA: General.        FINDINGS:   1.  Right femoral and obturator hernias reduced and repaired with preperitoneal, retrofascial placement of a 5 inch x 6 inch piece of polypropylene mesh       INDICATIONS: The patient is a 54 y.o. female who presented with right groin pain.  Subsequent evaluation including CT scan suggested a right femoral hernia.  The risks and benefits of a laparoscopic right femoral hernia repair with mesh were discussed at length with the patient and her family, and they agreed to proceed.       DESCRIPTION OF PROCEDURE:        After obtaining informed consent, the patient was taken to the operating room and placed in supine position.The abdomen  was prepped and draped in standard sterile fashion and covered with an Ioban dressing to prevent contact of mesh with the skin.  A transverse incision was made superior to the umbilicus.  Blunt dissection was carried to the fascia which is all anteriorly and sharply divided.  Blunt dissection was carried into the peritoneal cavity.  A stay suture of 0 Vicryl was placed around the fascial defect using a 0 Vicryl suture.  A Harvey trocar was advanced without difficulty into the abdominal cavity.  The abdomen was insufflated with carbon dioxide gas to a pressure of 15 mmHg.  At this point the laparoscope was advanced through the trocar and the abdominal contents inspected.  There is no evidence of bowel, bladder, or visceral injury with entry of the trocar.  At this point 2 additional 5 mm ports were placed, 1 at the level of  the umbilicus at the anterior joint line on the left, and the other at the level of the umbilicus on the anterior x-ray line on the right.    Inspecting the pelvis there was indeed both a small femoral hernia containing fat as well as a small obturator hernia containing fat.  Just inferior to the umbilicus the peritoneum was scored and a preperitoneal space developed.  This dissection plane was carried inferiorly toward the pelvis.  We reached the pubic symphysis and the preperitoneal space was developed in this location.  The femoral hernia was completely reduced as was the obturator hernia.  Additional blunt dissection was performed in the suprapubic area, and an additional 5 mm trocar was placed in this location.    A piece of 5 inch x 6 inch polypropylene mesh  was then brought in the field and placed on the right side.  It was tacked medially to Carlos's ligament, laterally to the anterior abdominal wall, was made to cover the direct, indirect, obturator and femoral areas without difficulty. The preperitoneal space was then reapproximated to the intra-abdominal wall using the spiral tacker.  This completely covered the mesh.  All hernias have been reduced.  At this point the abdomen was desufflated and all trochars removed under direct and laparoscopic visualization.     The 12 mm trocar site fascia was closed using the previously placed 0 Vicryl suture.  The wounds irrigated with saline and closed nature using running subcuticular sutures.  The incisions were dressed in standard sterile fashion, and covered with dry sterile dressings.       All sponge and needle counts were correct times two at the completion of the procedure. The patient recovered from anesthesia, was extubated in the operating room  and was transported to the PACU   in stable condition.    Assistant: Teresa Marcelino RNFA  was responsible for performing the following activities: Retraction, Placing Dressing, and Held/Positioned Camera and  their skilled assistance was necessary for the success of this case.    Dmitry Edmond MD  7/18/2024  08:52 EDT

## 2024-07-18 NOTE — ANESTHESIA PREPROCEDURE EVALUATION
Anesthesia Evaluation     Patient summary reviewed and Nursing notes reviewed   NPO Solid Status: > 8 hours  NPO Liquid Status: > 2 hours           Airway   Mallampati: I  TM distance: >3 FB  Neck ROM: full  No difficulty expected  Dental      Pulmonary    (-) shortness of breath, recent URI, sleep apnea, not a smoker  Cardiovascular     (-) past MI, dysrhythmias, cardiac stents      Neuro/Psych  (+) headaches, numbness  (-) seizures, CVA    ROS Comment: Scoliosis spondy   pyriformis L bursitis   GI/Hepatic/Renal/Endo    (-) no renal disease, diabetes, no thyroid disorder    Musculoskeletal     Abdominal    Substance History      OB/GYN          Other   arthritis,     ROS/Med Hx Other: Labs and urine neg       Phys Exam Other: MAC3 G 1V [facility] 2022               Anesthesia Plan    ASA 1     general     (TAPS ? (Ask Dr Edmond)   Propofol infusion as part of an anti PONV technique  )  intravenous induction     Anesthetic plan, risks, benefits, and alternatives have been provided, discussed and informed consent has been obtained with: patient.    Plan discussed with CRNA.      CODE STATUS:

## 2024-07-18 NOTE — ANESTHESIA POSTPROCEDURE EVALUATION
Patient: Santa Rojas    Procedure Summary       Date: 07/18/24 Room / Location:  [facility] 04 /  [facility]    Anesthesia Start: 0751 Anesthesia Stop:     Procedure: LAPAROSCOPIC RIGHT FEMORAL HERNIA REPAIR WITH MESH (Right: Abdomen) Diagnosis:     Surgeons: Dmitry Edmond MD Provider: Arnulfo Frey MD    Anesthesia Type: general ASA Status: 1            Anesthesia Type: general    Vitals  Vitals Value Taken Time   BP     Temp     Pulse 66 07/18/24 0903   Resp     SpO2 100 % 07/18/24 0903   Vitals shown include unfiled device data.        Post Anesthesia Care and Evaluation    Patient location during evaluation: PACU  Patient participation: complete - patient participated  Level of consciousness: awake and alert  Pain management: adequate    Airway patency: patent  Anesthetic complications: No anesthetic complications  PONV Status: none  Cardiovascular status: hemodynamically stable and acceptable  Respiratory status: nonlabored ventilation, acceptable and nasal cannula  Hydration status: acceptable

## 2024-07-18 NOTE — ANESTHESIA PROCEDURE NOTES
Airway  Date/Time: 7/18/2024 8:02 AM  Airway not difficult    General Information and Staff    Patient location during procedure: OR  CRNA/CAA: Bri Bower CRNA    Indications and Patient Condition  Indications for airway management: airway protection    Preoxygenated: yes  Mask difficulty assessment: 1 - vent by mask    Final Airway Details  Final airway type: endotracheal airway      Successful airway: ETT  Cuffed: yes   Successful intubation technique: direct laryngoscopy  Facilitating devices/methods: intubating stylet  Endotracheal tube insertion site: oral  Blade: Chau  Blade size: 2  ETT size (mm): 7.0  Cormack-Lehane Classification: grade I - full view of glottis  Placement verified by: chest auscultation and capnometry   Measured from: lips  ETT/EBT  to lips (cm): 21  Number of attempts at approach: 1  Assessment: lips, teeth, and gum same as pre-op and atraumatic intubation

## 2024-07-18 NOTE — ANESTHESIA PROCEDURE NOTES
Peripheral Block    Pre-sedation assessment completed: 7/18/2024 7:58 AM    Patient reassessed immediately prior to procedure    Patient location during procedure: OR  Start time: 7/18/2024 7:59 AM  Stop time: 7/18/2024 8:04 AM  Reason for block: at surgeon's request and post-op pain management  Performed by  CRNA/CAA: Gaston Lozada CRNA  Preanesthetic Checklist  Completed: patient identified, IV checked, site marked, risks and benefits discussed, surgical consent, monitors and equipment checked, pre-op evaluation and timeout performed  Prep:  Pt Position: supine  Sterile barriers:cap, gloves, mask and washed/disinfected hands  Prep: ChloraPrep  Patient monitoring: blood pressure monitoring, continuous pulse oximetry and EKG  Procedure    Sedation: yes  Performed under: general  Guidance:ultrasound guided  Images:still images obtained, printed/placed on chart    Laterality:Bilateral  Block Type:TAP  Injection Technique:single-shot  Needle Type:short-bevel and echogenic  Needle Gauge:20 G  Resistance on Injection: none    Medications Used: bupivacaine PF (MARCAINE) 0.25 % injection - Injection   60 mL - 7/18/2024 8:04:00 AM  dexamethasone sodium phosphate injection - Injection   4 mg - 7/18/2024 8:04:00 AM      Medications  Comment:Block Injection:  LA dose divided between Right and Left block        Post Assessment  Injection Assessment: negative aspiration for heme, incremental injection and no paresthesia on injection  Patient Tolerance:comfortable throughout block  Complications:no  Additional Notes        Mid-Axillary/Lateral TAPs    A high-frequency linear transducer, with sterile cover, was placed in the midaxillary line between the ASIS and costal margin. The External Oblique Muscle (EOM), Internal Oblique Muscle (IOM), Transverse Abdominus Muscle (SHAHID), and Peritoneum were identified. The insertion site was prepped in sterile fashion and then localized with 2-5 ml of 1% Lidocaine. Using  "ultrasound-guidance, a 20-gauge B-Perea 4\" Ultraplex 360 non-stimulating echogenic needle was advanced in plane, from medial to lateral, until the tip of the needle was in the fascial plane between the IOM and SHAHID. 1-3ml of preservative free normal saline was used to hydro-dissect the fascial planes. After the fascial plane was verified, the local anesthetic (LA) was injected. The procedure was repeated on the opposite side for bilateral coverage. Aspiration every 5 ml to prevent intravascular injection. Injection was completed with negative aspiration of blood and negative intravascular injection. Injection pressures were normal with minimal resistance. Midaxillary TAPs should reach intercostal nerves T10- T11 and the subcostal nerve T12.    Performed by: Bri Bower CRNA            "

## 2024-07-18 NOTE — BRIEF OP NOTE
INGUINAL HERNIA REPAIR LAPAROSCOPIC  Progress Note    Santa ZAMARRIPA Bob  7/18/2024    Pre-op Diagnosis:   Right femoral hernia       Post-Op Diagnosis Codes:  Right femoral and obturator hernias    Procedure/CPT® Codes:        Procedure(s):  LAPAROSCOPIC RIGHT FEMORAL HERNIA REPAIR WITH MESH              Surgeon(s):  Dmitry Edmond MD    Anesthesia: General with Block    Staff:   Circulator: Geneva Cain RN  Scrub Person: Lisa Valle  Nursing Assistant: Niecy Rogers PCT  Assistant: Teresa Marcelino RNFA  Assistant: Teresa Marcelino RNFA      Estimated Blood Loss: minimal    Urine Voided: * No values recorded between 7/18/2024  7:51 AM and 7/18/2024  8:44 AM *    Specimens:                None          Drains: * No LDAs found *    Findings: Right femoral and obturator hernias completely reduced and repaired with a retrofascial, preperitoneal placement of a 5 x 6 cm piece of polypropylene mesh        Complications: None    Assistant: Teresa Marcelino RNFA  was responsible for performing the following activities: Retraction, Placing Dressing, and Held/Positioned Camera and their skilled assistance was necessary for the success of this case.    Dmitry Edmond MD     Date: 7/18/2024  Time: 08:51 EDT

## 2024-07-18 NOTE — H&P
"  Pre-Op H&P  Santa Rojas  [id number]  1970      Chief complaint: right groin pain      Subjective:  Patient is a 54 y.o.female presents for scheduled surgery by Dr. Edmond. She anticipates a LAPAROSCOPIC RIGHT FEMORAL HERNIA REPAIR WITH MESH  today. She reports intermittent groin bulge and pain worsening over the last year. She had CT that after further review appeared to show small fat containing right femoral hernia. She has increased pain with activity. Lying down or manual reduction relieve pain.       Review of Systems:  Constitutional-- No fever, chills or sweats. No fatigue.  CV-- No chest pain, palpitation or syncope  Resp-- No SOB, cough, hemoptysis  Skin--No rashes or lesions      Allergies: No Known Allergies      Home Meds:  Medications Prior to Admission   Medication Sig Dispense Refill Last Dose    magnesium chloride ER 64 MG DR tablet Take 1 tablet by mouth Every Night.   2024 at 2000         PMH:   Past Medical History:   Diagnosis Date    Extremity pain right     Joint pain     Migraine     Tarsal tunnel syndrome of left side      PSH:    Past Surgical History:   Procedure Laterality Date    ABDOMINOPLASTY      APPENDECTOMY      AUGMENTATION MAMMAPLASTY Bilateral 2016     SECTION      x2    COLONOSCOPY      TARSAL TUNNEL RELEASE Bilateral        Immunization History:  Influenza: UTD  Pneumococcal: UTD  Tetanus: UTD    Social History:   Tobacco:   Social History     Tobacco Use   Smoking Status Never   Smokeless Tobacco Never      Alcohol:     Social History     Substance and Sexual Activity   Alcohol Use Yes    Alcohol/week: 1.0 standard drink of alcohol    Types: 1 Drinks containing 0.5 oz of alcohol per week    Comment: rarely         Physical Exam: /84 (BP Location: Right arm, Patient Position: Lying)   Pulse 67   Temp 97.3 °F (36.3 °C) (Tympanic)   Resp 16   Ht 162.6 cm (64\")   Wt 78 kg (172 lb)   SpO2 100%   BMI 29.52 kg/m²       General Appearance:    Alert, " cooperative, no distress, appears stated age   Head:    Normocephalic, without obvious abnormality, atraumatic   Lungs:     Clear to auscultation bilaterally, respirations unlabored    Heart:   Regular rate and rhythm, S1 and S2 normal    Abdomen:    Soft without tenderness   Extremities:   Extremities normal, atraumatic, no cyanosis or edema   Skin:   Skin color, texture, turgor normal, no rashes or lesions   Neurologic:   Grossly intact     Results Review:     LABS:  Lab Results   Component Value Date    WBC 4.84 07/15/2024    HGB 14.1 07/15/2024    HCT 43.5 07/15/2024    MCV 92.0 07/15/2024     07/15/2024    NEUTROABS 2.41 07/15/2024    GLUCOSE 95 07/15/2024    BUN 17 07/15/2024    CREATININE 0.68 07/15/2024    EGFRIFNONA 97 09/21/2021     07/15/2024    K 4.1 07/15/2024     07/15/2024    CO2 29.0 07/15/2024    CALCIUM 9.6 07/15/2024    ALBUMIN 4.1 07/15/2024    AST 11 07/15/2024    ALT 13 07/15/2024    BILITOT 0.4 07/15/2024       RADIOLOGY:  Imaging Results (Last 72 Hours)       ** No results found for the last 72 hours. **            I reviewed the patient's new clinical results.    Cancer Staging (if applicable)  Cancer Patient: __ yes __no __unknown; If yes, clinical stage T:__ N:__M:__, stage group or __N/A      Impression: Femoral hernia without obstruction or gangrene       Plan: LAPAROSCOPIC RIGHT FEMORAL HERNIA REPAIR WITH MESH       Bety Blanton, JENS   7/18/2024   07:15 EDT

## 2024-07-24 ENCOUNTER — APPOINTMENT (OUTPATIENT)
Dept: MRI IMAGING | Facility: HOSPITAL | Age: 54
End: 2024-07-24
Payer: COMMERCIAL

## 2024-07-24 ENCOUNTER — HOSPITAL ENCOUNTER (EMERGENCY)
Facility: HOSPITAL | Age: 54
Discharge: HOME | End: 2024-07-24
Attending: EMERGENCY MEDICINE
Payer: COMMERCIAL

## 2024-07-24 VITALS
BODY MASS INDEX: 29.37 KG/M2 | WEIGHT: 172 LBS | OXYGEN SATURATION: 98 % | SYSTOLIC BLOOD PRESSURE: 121 MMHG | HEART RATE: 65 BPM | DIASTOLIC BLOOD PRESSURE: 64 MMHG | TEMPERATURE: 98.6 F | HEIGHT: 64 IN | RESPIRATION RATE: 18 BRPM

## 2024-07-24 DIAGNOSIS — M54.31 SCIATICA OF RIGHT SIDE: Primary | ICD-10-CM

## 2024-07-24 LAB
ALBUMIN SERPL-MCNC: 3.8 G/DL (ref 3.5–5.2)
ALBUMIN/GLOB SERPL: 1.1 G/DL
ALP SERPL-CCNC: 83 U/L (ref 39–117)
ALT SERPL W P-5'-P-CCNC: 12 U/L (ref 1–33)
ANION GAP SERPL CALCULATED.3IONS-SCNC: 11 MMOL/L (ref 5–15)
AST SERPL-CCNC: 16 U/L (ref 1–32)
BACTERIA UR QL AUTO: ABNORMAL /HPF
BASOPHILS # BLD AUTO: 0.02 10*3/MM3 (ref 0–0.2)
BASOPHILS NFR BLD AUTO: 0.2 % (ref 0–1.5)
BILIRUB SERPL-MCNC: 0.6 MG/DL (ref 0–1.2)
BILIRUB UR QL STRIP: NEGATIVE
BUN SERPL-MCNC: 19 MG/DL (ref 6–20)
BUN/CREAT SERPL: 24.1 (ref 7–25)
CALCIUM SPEC-SCNC: 9.4 MG/DL (ref 8.6–10.5)
CHLORIDE SERPL-SCNC: 102 MMOL/L (ref 98–107)
CLARITY UR: CLEAR
CO2 SERPL-SCNC: 22 MMOL/L (ref 22–29)
COLOR UR: YELLOW
CREAT SERPL-MCNC: 0.79 MG/DL (ref 0.57–1)
DEPRECATED RDW RBC AUTO: 46.4 FL (ref 37–54)
EGFRCR SERPLBLD CKD-EPI 2021: 90.1 ML/MIN/1.73
EOSINOPHIL # BLD AUTO: 0.13 10*3/MM3 (ref 0–0.4)
EOSINOPHIL NFR BLD AUTO: 1.2 % (ref 0.3–6.2)
ERYTHROCYTE [DISTWIDTH] IN BLOOD BY AUTOMATED COUNT: 13.7 % (ref 12.3–15.4)
GLOBULIN UR ELPH-MCNC: 3.5 GM/DL
GLUCOSE SERPL-MCNC: 87 MG/DL (ref 65–99)
GLUCOSE UR STRIP-MCNC: NEGATIVE MG/DL
HCT VFR BLD AUTO: 44 % (ref 34–46.6)
HGB BLD-MCNC: 14.1 G/DL (ref 12–15.9)
HGB UR QL STRIP.AUTO: ABNORMAL
HYALINE CASTS UR QL AUTO: ABNORMAL /LPF
IMM GRANULOCYTES # BLD AUTO: 0.03 10*3/MM3 (ref 0–0.05)
IMM GRANULOCYTES NFR BLD AUTO: 0.3 % (ref 0–0.5)
KETONES UR QL STRIP: NEGATIVE
LEUKOCYTE ESTERASE UR QL STRIP.AUTO: NEGATIVE
LIPASE SERPL-CCNC: 26 U/L (ref 13–60)
LYMPHOCYTES # BLD AUTO: 1.61 10*3/MM3 (ref 0.7–3.1)
LYMPHOCYTES NFR BLD AUTO: 14.7 % (ref 19.6–45.3)
MCH RBC QN AUTO: 29.3 PG (ref 26.6–33)
MCHC RBC AUTO-ENTMCNC: 32 G/DL (ref 31.5–35.7)
MCV RBC AUTO: 91.5 FL (ref 79–97)
MONOCYTES # BLD AUTO: 0.97 10*3/MM3 (ref 0.1–0.9)
MONOCYTES NFR BLD AUTO: 8.8 % (ref 5–12)
NEUTROPHILS NFR BLD AUTO: 74.8 % (ref 42.7–76)
NEUTROPHILS NFR BLD AUTO: 8.21 10*3/MM3 (ref 1.7–7)
NITRITE UR QL STRIP: NEGATIVE
NRBC BLD AUTO-RTO: 0 /100 WBC (ref 0–0.2)
PH UR STRIP.AUTO: 8 [PH] (ref 5–8)
PLATELET # BLD AUTO: 290 10*3/MM3 (ref 140–450)
PMV BLD AUTO: 10.4 FL (ref 6–12)
POTASSIUM SERPL-SCNC: 4.1 MMOL/L (ref 3.5–5.2)
PROT SERPL-MCNC: 7.3 G/DL (ref 6–8.5)
PROT UR QL STRIP: NEGATIVE
RBC # BLD AUTO: 4.81 10*6/MM3 (ref 3.77–5.28)
RBC # UR STRIP: ABNORMAL /HPF
REF LAB TEST METHOD: ABNORMAL
SODIUM SERPL-SCNC: 135 MMOL/L (ref 136–145)
SP GR UR STRIP: 1.01 (ref 1–1.03)
SQUAMOUS #/AREA URNS HPF: ABNORMAL /HPF
UROBILINOGEN UR QL STRIP: ABNORMAL
WBC # UR STRIP: ABNORMAL /HPF
WBC NRBC COR # BLD AUTO: 10.97 10*3/MM3 (ref 3.4–10.8)

## 2024-07-24 PROCEDURE — 25810000003 SODIUM CHLORIDE 0.9 % SOLUTION: Performed by: EMERGENCY MEDICINE

## 2024-07-24 PROCEDURE — 25010000002 HYDROMORPHONE PER 4 MG: Performed by: EMERGENCY MEDICINE

## 2024-07-24 PROCEDURE — 72148 MRI LUMBAR SPINE W/O DYE: CPT

## 2024-07-24 PROCEDURE — 85025 COMPLETE CBC W/AUTO DIFF WBC: CPT | Performed by: EMERGENCY MEDICINE

## 2024-07-24 PROCEDURE — 36415 COLL VENOUS BLD VENIPUNCTURE: CPT

## 2024-07-24 PROCEDURE — 25010000002 MIDAZOLAM PER 1 MG: Performed by: EMERGENCY MEDICINE

## 2024-07-24 PROCEDURE — 25010000002 ONDANSETRON PER 1 MG: Performed by: EMERGENCY MEDICINE

## 2024-07-24 PROCEDURE — 96374 THER/PROPH/DIAG INJ IV PUSH: CPT

## 2024-07-24 PROCEDURE — 83690 ASSAY OF LIPASE: CPT | Performed by: EMERGENCY MEDICINE

## 2024-07-24 PROCEDURE — 80053 COMPREHEN METABOLIC PANEL: CPT | Performed by: EMERGENCY MEDICINE

## 2024-07-24 PROCEDURE — 99284 EMERGENCY DEPT VISIT MOD MDM: CPT

## 2024-07-24 PROCEDURE — 96375 TX/PRO/DX INJ NEW DRUG ADDON: CPT

## 2024-07-24 PROCEDURE — 25010000002 KETOROLAC TROMETHAMINE PER 15 MG: Performed by: EMERGENCY MEDICINE

## 2024-07-24 PROCEDURE — 81001 URINALYSIS AUTO W/SCOPE: CPT | Performed by: EMERGENCY MEDICINE

## 2024-07-24 RX ORDER — KETOROLAC TROMETHAMINE 15 MG/ML
15 INJECTION, SOLUTION INTRAMUSCULAR; INTRAVENOUS ONCE
Status: COMPLETED | OUTPATIENT
Start: 2024-07-24 | End: 2024-07-24

## 2024-07-24 RX ORDER — SODIUM CHLORIDE 0.9 % (FLUSH) 0.9 %
10 SYRINGE (ML) INJECTION AS NEEDED
Status: DISCONTINUED | OUTPATIENT
Start: 2024-07-24 | End: 2024-07-24 | Stop reason: HOSPADM

## 2024-07-24 RX ORDER — LORAZEPAM 1 MG/1
1 TABLET ORAL ONCE
Status: COMPLETED | OUTPATIENT
Start: 2024-07-24 | End: 2024-07-24

## 2024-07-24 RX ORDER — PREDNISONE 20 MG/1
20 TABLET ORAL
Qty: 15 TABLET | Refills: 0 | Status: SHIPPED | OUTPATIENT
Start: 2024-07-24

## 2024-07-24 RX ORDER — HYDROCODONE BITARTRATE AND ACETAMINOPHEN 5; 325 MG/1; MG/1
1 TABLET ORAL EVERY 6 HOURS PRN
Qty: 12 TABLET | Refills: 0 | Status: SHIPPED | OUTPATIENT
Start: 2024-07-24

## 2024-07-24 RX ORDER — MIDAZOLAM HYDROCHLORIDE 1 MG/ML
1 INJECTION INTRAMUSCULAR; INTRAVENOUS ONCE
Status: COMPLETED | OUTPATIENT
Start: 2024-07-24 | End: 2024-07-24

## 2024-07-24 RX ORDER — ONDANSETRON 2 MG/ML
4 INJECTION INTRAMUSCULAR; INTRAVENOUS ONCE
Status: COMPLETED | OUTPATIENT
Start: 2024-07-24 | End: 2024-07-24

## 2024-07-24 RX ORDER — HYDROMORPHONE HYDROCHLORIDE 1 MG/ML
0.5 INJECTION, SOLUTION INTRAMUSCULAR; INTRAVENOUS; SUBCUTANEOUS ONCE
Status: COMPLETED | OUTPATIENT
Start: 2024-07-24 | End: 2024-07-24

## 2024-07-24 RX ORDER — CYCLOBENZAPRINE HCL 5 MG
5 TABLET ORAL 3 TIMES DAILY PRN
Qty: 20 TABLET | Refills: 0 | Status: SHIPPED | OUTPATIENT
Start: 2024-07-24

## 2024-07-24 RX ADMIN — KETOROLAC TROMETHAMINE 15 MG: 15 INJECTION, SOLUTION INTRAMUSCULAR; INTRAVENOUS at 13:26

## 2024-07-24 RX ADMIN — SODIUM CHLORIDE 500 ML: 9 INJECTION, SOLUTION INTRAVENOUS at 11:10

## 2024-07-24 RX ADMIN — MIDAZOLAM HYDROCHLORIDE 1 MG: 1 INJECTION, SOLUTION INTRAMUSCULAR; INTRAVENOUS at 12:22

## 2024-07-24 RX ADMIN — LORAZEPAM 1 MG: 1 TABLET ORAL at 12:12

## 2024-07-24 RX ADMIN — HYDROMORPHONE HYDROCHLORIDE 0.5 MG: 1 INJECTION, SOLUTION INTRAMUSCULAR; INTRAVENOUS; SUBCUTANEOUS at 11:10

## 2024-07-24 RX ADMIN — ONDANSETRON 4 MG: 2 INJECTION INTRAMUSCULAR; INTRAVENOUS at 11:10

## 2024-07-24 NOTE — ED PROVIDER NOTES
"Subjective   History of Present Illness  54-year-old female presents for evaluation of severe low back pain.  She was sent here to the emergency department by Dr. Edmond of general surgery.  Of note, she is 6 days status post femoral hernia repair.  She notes that she had been doing well postoperatively until yesterday morning when she woke up with severe low back pain with right-sided radicular symptoms into her buttocks and right thigh.  She is unsure as to what may have triggered her pain.  She tells me that she has had issues with low back pain from time to time but never this severe.  She wonders if she \"slept on her back funny\" and denies any preceding injury, fall, or trauma.  No heavy lifting.  She denies any bowel or bladder incontinence or retention.  No IV drug use.  No fevers, chills, night sweats, or unintentional weight loss.  She currently rates her pain at 9 out of 10 in severity and notes that the pain is worse with attempted movement.  She is ambulatory, albeit with pain.  She denies any accompanying abdominal pain.  No vomiting.  She saw Dr. Edmond for follow-up today and was sent here to the ED for evaluation and pain control.      Review of Systems   Musculoskeletal:  Positive for back pain.   All other systems reviewed and are negative.      Past Medical History:   Diagnosis Date    Extremity pain right     Joint pain     Migraine     Tarsal tunnel syndrome of left side        No Known Allergies    Past Surgical History:   Procedure Laterality Date    ABDOMINOPLASTY      APPENDECTOMY      AUGMENTATION MAMMAPLASTY Bilateral 2016     SECTION      x2    COLONOSCOPY      INGUINAL HERNIA REPAIR Right 2024    Procedure: LAPAROSCOPIC RIGHT FEMORAL HERNIA REPAIR WITH MESH;  Surgeon: Dmitry Edmond MD;  Location: [facility];  Service: General;  Laterality: Right;    TARSAL TUNNEL RELEASE Bilateral        Family History   Problem Relation Age of Onset    Breast cancer Maternal Grandmother  "        age unknown    Hyperlipidemia Mother     Ovarian cancer Neg Hx        Social History     Socioeconomic History    Marital status:    Tobacco Use    Smoking status: Never    Smokeless tobacco: Never   Vaping Use    Vaping status: Never Used   Substance and Sexual Activity    Alcohol use: Yes     Alcohol/week: 1.0 standard drink of alcohol     Types: 1 Drinks containing 0.5 oz of alcohol per week     Comment: rarely    Drug use: Never    Sexual activity: Yes     Partners: Male     Birth control/protection: Surgical     Comment: Tubal and uterine ablation.           Objective   Physical Exam  Vitals and nursing note reviewed.   Constitutional:       General: She is not in acute distress.     Appearance: She is well-developed. She is not diaphoretic.      Comments: Nontoxic-appearing female   HENT:      Head: Normocephalic and atraumatic.   Eyes:      Pupils: Pupils are equal, round, and reactive to light.   Cardiovascular:      Rate and Rhythm: Normal rate and regular rhythm.      Heart sounds: Normal heart sounds. No murmur heard.     No friction rub. No gallop.   Pulmonary:      Effort: Pulmonary effort is normal. No respiratory distress.      Breath sounds: Normal breath sounds. No wheezing or rales.   Abdominal:      General: Bowel sounds are normal. There is no distension.      Palpations: Abdomen is soft. There is no mass.      Tenderness: There is no abdominal tenderness. There is no guarding or rebound.      Comments: No focal abdominal tenderness, no peritoneal signs, no pain out of proportion to exam   Genitourinary:     Comments: No CVA tenderness present  Musculoskeletal:         General: Normal range of motion.      Cervical back: Neck supple.   Skin:     General: Skin is warm and dry.      Findings: No erythema or rash.      Comments: No dermatomal rash present    Surgical sites to anterior abdominal wall are clean, dry, and intact without any surrounding warmth erythema noted, no purulent  drainage   Neurological:      Mental Status: She is alert and oriented to person, place, and time.      Comments: Neurovascularly intact distally in both lower extremities with bounding distal pulses and normal sensation noted, ambulatory, no saddle anesthesia present, normoreflexic   Psychiatric:         Mood and Affect: Mood normal.         Thought Content: Thought content normal.         Judgment: Judgment normal.         Procedures           ED Course  ED Course as of 07/24/24 1625 Wed Jul 24, 2024   1108 54-year-old female presents for evaluation of low back pain.  She was sent here to the emergency department by Dr. Edmond of general surgery.  She is 6 days status post femoral hernia repair.  She notes that she had been doing well postoperatively until yesterday morning when she woke up with severe low back pain with radicular symptoms into her right lower extremity. [DD]   1110 She saw Dr. Edmond for follow-up today and was sent to the ED to be evaluated.  On arrival, the patient is nontoxic-appearing.  Nonsurgical abdomen.  Surgical sites appear clean, dry, and intact.  She denies any bowel or bladder incontinence or retention.  No saddle anesthesia noted on exam.  No neurological deficits noted on exam.  Doubt emergent/surgical intra-abdominal process.  Given her significant pain, we will obtain advanced imaging, provide pain control, and we will reassess following initial interventions. [DD]   1143 Labs are bland/unrevealing. [DD]   1309 I personally and independently reviewed the patient's MRI images and findings, and I am in agreement with the radiologist regarding MRI interpretation--particularly regarding lumbar arthropathy.  No cord compromise noted.  Doubt emergent central process. [DD]   1319 Upon reevaluation, the patient looks and feels much improved.  Her pain is adequately controlled.  I feel that she can safely be managed on an outpatient basis at this point.  Prescription for Norco, steroid  burst, and Flexeril.  I referred her to Dr. Clark of neurosurgery and she will follow-up as needed if she fails conservative measures.  Agreeable with plan and given appropriate strict return precautions. [DD]      ED Course User Index  [DD] Gaston Price MD                                   Recent Results (from the past 24 hour(s))   Urinalysis With Culture If Indicated - Urine, Clean Catch    Collection Time: 07/24/24 10:59 AM    Specimen: Urine, Clean Catch   Result Value Ref Range    Color, UA Yellow Yellow, Straw    Appearance, UA Clear Clear    pH, UA 8.0 5.0 - 8.0    Specific Gravity, UA 1.011 1.001 - 1.030    Glucose, UA Negative Negative    Ketones, UA Negative Negative    Bilirubin, UA Negative Negative    Blood, UA Trace (A) Negative    Protein, UA Negative Negative    Leuk Esterase, UA Negative Negative    Nitrite, UA Negative Negative    Urobilinogen, UA 0.2 E.U./dL 0.2 - 1.0 E.U./dL   Urinalysis, Microscopic Only - Urine, Clean Catch    Collection Time: 07/24/24 10:59 AM    Specimen: Urine, Clean Catch   Result Value Ref Range    RBC, UA 6-10 (A) None Seen, 0-2 /HPF    WBC, UA 0-2 None Seen, 0-2 /HPF    Bacteria, UA None Seen None Seen, Trace /HPF    Squamous Epithelial Cells, UA 0-2 None Seen, 0-2 /HPF    Hyaline Casts, UA None Seen 0 - 6 /LPF    Methodology Automated Microscopy    CBC Auto Differential    Collection Time: 07/24/24 11:07 AM    Specimen: Blood   Result Value Ref Range    WBC 10.97 (H) 3.40 - 10.80 10*3/mm3    RBC 4.81 3.77 - 5.28 10*6/mm3    Hemoglobin 14.1 12.0 - 15.9 g/dL    Hematocrit 44.0 34.0 - 46.6 %    MCV 91.5 79.0 - 97.0 fL    MCH 29.3 26.6 - 33.0 pg    MCHC 32.0 31.5 - 35.7 g/dL    RDW 13.7 12.3 - 15.4 %    RDW-SD 46.4 37.0 - 54.0 fl    MPV 10.4 6.0 - 12.0 fL    Platelets 290 140 - 450 10*3/mm3    Neutrophil % 74.8 42.7 - 76.0 %    Lymphocyte % 14.7 (L) 19.6 - 45.3 %    Monocyte % 8.8 5.0 - 12.0 %    Eosinophil % 1.2 0.3 - 6.2 %    Basophil % 0.2 0.0 - 1.5 %     Immature Grans % 0.3 0.0 - 0.5 %    Neutrophils, Absolute 8.21 (H) 1.70 - 7.00 10*3/mm3    Lymphocytes, Absolute 1.61 0.70 - 3.10 10*3/mm3    Monocytes, Absolute 0.97 (H) 0.10 - 0.90 10*3/mm3    Eosinophils, Absolute 0.13 0.00 - 0.40 10*3/mm3    Basophils, Absolute 0.02 0.00 - 0.20 10*3/mm3    Immature Grans, Absolute 0.03 0.00 - 0.05 10*3/mm3    nRBC 0.0 0.0 - 0.2 /100 WBC   Comprehensive Metabolic Panel    Collection Time: 07/24/24 11:42 AM    Specimen: Blood   Result Value Ref Range    Glucose 87 65 - 99 mg/dL    BUN 19 6 - 20 mg/dL    Creatinine 0.79 0.57 - 1.00 mg/dL    Sodium 135 (L) 136 - 145 mmol/L    Potassium 4.1 3.5 - 5.2 mmol/L    Chloride 102 98 - 107 mmol/L    CO2 22.0 22.0 - 29.0 mmol/L    Calcium 9.4 8.6 - 10.5 mg/dL    Total Protein 7.3 6.0 - 8.5 g/dL    Albumin 3.8 3.5 - 5.2 g/dL    ALT (SGPT) 12 1 - 33 U/L    AST (SGOT) 16 1 - 32 U/L    Alkaline Phosphatase 83 39 - 117 U/L    Total Bilirubin 0.6 0.0 - 1.2 mg/dL    Globulin 3.5 gm/dL    A/G Ratio 1.1 g/dL    BUN/Creatinine Ratio 24.1 7.0 - 25.0    Anion Gap 11.0 5.0 - 15.0 mmol/L    eGFR 90.1 >60.0 mL/min/1.73   Lipase    Collection Time: 07/24/24 11:42 AM    Specimen: Blood   Result Value Ref Range    Lipase 26 13 - 60 U/L     Note: In addition to lab results from this visit, the labs listed above may include labs taken at another facility or during a different encounter within the last 24 hours. Please correlate lab times with ED admission and discharge times for further clarification of the services performed during this visit.    MRI Lumbar Spine Without Contrast   Final Result   Impression:   1.There is mild to moderate right-sided active facet arthropathy at L3/4 and L4/5 potentially acting as a source of pain. Correlate with symptoms.   2.Minimal discogenic changes at L3/4 L4/5.            Electronically Signed: Edmundo Barry MD     7/24/2024 1:02 PM EDT     Workstation ID: [id number]        Vitals:    07/24/24 1032 07/24/24 1200 07/24/24 1300  "07/24/24 1319   BP: 140/74 111/72 122/66 121/64   BP Location: Right arm   Right arm   Patient Position: Sitting   Lying   Pulse: 71 61  65   Resp: 20   18   Temp: 97.9 °F (36.6 °C)   98.6 °F (37 °C)   TempSrc: Oral   Oral   SpO2: 99% 93%  98%   Weight: 78 kg (172 lb)      Height: 162.6 cm (64\")        Medications   sodium chloride 0.9 % bolus 500 mL (0 mL Intravenous Stopped 7/24/24 1335)   HYDROmorphone (DILAUDID) injection 0.5 mg (0.5 mg Intravenous Given 7/24/24 1110)   ondansetron (ZOFRAN) injection 4 mg (4 mg Intravenous Given 7/24/24 1110)   midazolam (VERSED) injection 1 mg (1 mg Intravenous Given 7/24/24 1222)   LORazepam (ATIVAN) tablet 1 mg (1 mg Oral Given 7/24/24 1212)   ketorolac (TORADOL) injection 15 mg (15 mg Intravenous Given 7/24/24 1326)     ECG/EMG Results (last 24 hours)       ** No results found for the last 24 hours. **          No orders to display                 Medical Decision Making  Problems Addressed:  Sciatica of right side: complicated acute illness or injury    Amount and/or Complexity of Data Reviewed  Labs: ordered.  Radiology: ordered.    Risk  Prescription drug management.        Final diagnoses:   Sciatica of right side       ED Disposition  ED Disposition       ED Disposition   Discharge    Condition   Stable    Comment   --               Clint Clark MD  [address] [address]  [phone]      As needed         Medication List        New Prescriptions      cyclobenzaprine 5 MG tablet  Commonly known as: FLEXERIL  Take 1 tablet by mouth 3 (Three) Times a Day As Needed for Muscle Spasms.     HYDROcodone-acetaminophen 5-325 MG per tablet  Commonly known as: NORCO  Take 1 tablet by mouth Every 6 (Six) Hours As Needed for Moderate Pain.     predniSONE 20 MG tablet  Commonly known as: DELTASONE  Take 1 tablet by mouth 3 (Three) Times a Day With Meals.               Where to Get Your Medications        These medications were sent to [facility]/pharmacy " #[id number] - [place] - [address] AT NEXT TO [facility] - [phone] PH - [phone] FX  [address] [place] [address] [place] [address]      Phone: [phone]   cyclobenzaprine 5 MG tablet  HYDROcodone-acetaminophen 5-325 MG per tablet  predniSONE 20 MG tablet            Gaston Price MD  07/24/24 7093

## 2024-08-16 ENCOUNTER — TELEPHONE (OUTPATIENT)
Dept: PAIN MEDICINE | Facility: CLINIC | Age: 54
End: 2024-08-16
Payer: COMMERCIAL

## 2024-08-16 DIAGNOSIS — M41.9 SCOLIOSIS OF LUMBAR SPINE, UNSPECIFIED SCOLIOSIS TYPE: ICD-10-CM

## 2024-08-16 DIAGNOSIS — M47.816 SPONDYLOSIS OF LUMBAR REGION WITHOUT MYELOPATHY OR RADICULOPATHY: Primary | ICD-10-CM

## 2024-08-16 NOTE — PROGRESS NOTES
"Chief Complaint: \"Pain in my right lower back and right leg. It's much better now\"         Chronic Pain History: Ms. Santa Rojas, 54 y.o. female, who presents with new onset of severe right sided lower back and right lower extremity pain. Santa Rojas was last seen on 11/29/2021 when she underwent diagnostic and therapeutic left sacroiliac joint injection combined with left piriformis trigger point injection, from which she has experienced 100% ongoing pain relief and functional improvement. Historically, she always had left-sided lower back pain. Santa contacted me on Friday 08/16/2024, with a new complaint of RT sided LBP and RT LE pain. Santa Rojas recently underwent uneventful right femoral and obturator hernia laparoscopic surgical repair by Dr. Edmond on 07/18/2024. About 4 days later, she woke up with severe right-sided lower back pain radiating to the lateral aspect of the right leg up to the right ankle. She came to the ER at [facility] on 07/24/2024 for evaluation of severe lower back pain. She was 6 days status post hernia repair. She stated that she has done well after surgery until just the day before her ER visit. She complained of severe right-sided lower back pain radiating into the right lower extremity. She denied bladder or bowel issues. She started a Medrol dose pack that progressively provided pain relief. She is doing well during the daytime. During the nighttime, she experiences significant lower back pain with some radiation into the RLE affecting her sleep. She failed a medrol dose pack, opioids, muscle relaxers, ETC. MRI from her ER visit reviewed revealed lumbar facet effusions RT at L3-L4 and RT L4-L5. Loss of disc height at L3-L4. I ordered full lumbar X-rays with flexion/extension to assess stability.   Pain Description: Constant right-sided lower back pain with intermittent exacerbation, described as aching, dull, sharp, throbbing, burning, shooting, and tight band sensation.   Radiation " of Pain: The pain radiates intermittently into the RLE to the ankle  Pain intensity today: 1/10   Average pain intensity last week: 2/10  Pain intensity ranges from: 0/10 to 5/10  Aggravating factors: Pain increases with lifting, bending forward, arching the back, twisting, lying down. Patient denies neurogenic claudication.    Alleviating factors: Pain decreases with changing positions  Associated Symptoms:   Patient denies numbness or weakness in the lower extremities.   Patient denies any new bladder or bowel problems.   Patient denies difficulties with her balance or recent falls.   Pain interferes with general activities and affects patient's quality of life  Pain interferes with sleep: Falling asleep and causing sleep fragmentation    Muscle spasms: No  Stiffness: LB    Review of previous therapies and additional medical records:  Santa Rojas has already failed the following measures, including:   Conservative Measures: Oral analgesics, steroids, opioids, exercise program  Interventional Measures:   11/29/2021: Therapeutic left sacroiliac joint injection combined with therapeutic left piriformis trigger point injection  06/02/2021: Left lumbar medial branch RFA combined with trigger point injection of the left piriformis muscle.  04/07/2021: Diagnostic and therapeutic left sacroiliac joint injection combined with diagnostic and therapeutic Left piriformis muscle injection   12/20/2017: Lumbar medial branch rhizotomies combined with left GTB injection and trigger point injections at the left gluteus medius muscle   10/23/2017: DxTx left lumbar facet joint injections L4-L5 and L5-S1, DxTx left GTB injection, DxTx TPIs at the left gluteus medius x3  Surgical Measures: No history of previous cervical spine, lumbar spine or hip surgery   Santa Rojas has not undergone orthopedic spine or neurosurgical consultation for her chronic pain condition   Santa Rojas is a healthy adult other than for a history of  migraines  In terms of current analgesics, Santa Rojas takes: tizanidine PRN   I have reviewed Ramon Report consistent with medication reconciliation.  SOAPP/ORT: Low Risk     PHQ-2 Depression Screening  Little interest or pleasure in doing things? 0-->not at all   Feeling down, depressed, or hopeless? 0-->not at all   PHQ-2 Total Score 0     Pain Self-Efficacy Questionnaire (PSEQ)  ITEM 08-20 2024        I can enjoy things despite the pain. 6        I can do most of the household chores (tidying up, washing dishes, etc), despite the pain. 6        I can socialize with my friends or family members as often as I used to do, despite the pain. 6        I can cope with my pain in most situations. 6        I can do some form of work, despite the pain (includes housework, paid, and unpaid work). 6        I can still do many of the things I enjoy doing, such as hobbies or leisure activity despite pain. 6        I can cope with my pain without medications. 6        I can accomplish most of my goals in life despite the pain. 6        I can live in a normal lifestyle, despite the pain. 6        I can gradually become more active, despite the pain. 6        TOTAL SCORE 60/60            Global Pain Scale 08-20 2024          Pain 12          Feelings 0          Clinical outcomes 0          Activities 4          GPS Total: 16            The [place] Back Pain Disability Scale     DATE 08-20 2024          Sleep through the night 4          Turn over in bed 3          Get out of bed 2          Make your bed 0          Put on socks (pantyhose) 0          Ride in a car 0          Sit in a chair for several hours 0          Stand up for 20-30 minutes 0          Climb one flight of stairs 1          Walk a few blocks (200-300 yards)  0          Walk several miles 3          Run one block (about 50 yards) 4          Take food out of the refrigerator 0          Reach up to high shelves 0          Move a chair 0          Pull or push  heavy doors 0          Bend over to clean the bathtub 2          Throw a ball 0          Carry two bags of groceries 2          Lift and carry a heavy suitcase 5          Total score 26              Review of Diagnostic Studies:  I have independently reviewed and interpreted the images with the patient and used the images and a tridimensional spine model to explain findings. I have also reviewed the reports.  Complete x-rays of the lumbar spine including flexion-extension 08/19/2024 no significant instability  MRI of the lumbar spine wo contrast on 07/24/2024 revealed preservation of vertebral body heights and alignment.  Loss of disc height at L3-L4 and L4-L5.  There is paraspinal: Conus medullaris appear unremarkable.  The conus is seen at L2.  Axial imaging:  T12-L1, L1-L2, L2-L3: No significant neural foraminal or spinal canal stenosis.  L3-L4: Circumferential disc bulge, minimal endplate osteophyte formation. Moderate right-sided facet arthropathy with facet joint effusion and edema along the posterior margin of the facet joint. Mild foraminal stenosis.  L4-L5: Circumferential disc bulge. Moderate right-sided facet arthropathy with facet joint effusion and surrounding soft tissue edema.  L5-S1: No significant neural foraminal or spinal canal stenosis.  EMG/NCV of the bilateral lower extremities on 12/21/2021 by Dr. Tavo Cash. No evidence of focal nerve entrapment or radiculopathy.    Review of Previous Diagnostic Studies:    X-ray spine scoliosis 05/28/2021: Levoscoliotic curvature at the thoracolumbar junction at T12-L1, measured at 16.5 degrees.  Lumbar spine x-rays with flexion-extension views 5 28,021: Arthritic changes within the lumbar spine with slight curvature with convexity to the left. Degenerative changes within the sacroiliac joints.   Hip X-rays 09/25/2017: Mild degenerative changes of the hips.   Xray Spine Lumbar on 09/25/2017. Mild lumbar scoliosis with the convexity to the left measuring  approximately 7.7 degrees.     The following portions of the patient's history were reviewed and updated as appropriate: problem list, past medical history, past surgery history, social history, family history, medication reconciliation, and allergies    Review of Systems      Patient Active Problem List   Diagnosis    Spondylosis of lumbar region without myelopathy or radiculopathy    Greater trochanteric bursitis of left hip    Myofascial pain syndrome    Scoliosis of lumbar spine    History of migraine headaches    Sacroiliac joint dysfunction of left side    Piriformis syndrome of left side    Femoral hernia of right side    Degeneration of lumbar or lumbosacral intervertebral disc    Effusion of lumbar facet joint    Lumbar radiculitis    Lumbar spine instability       Past Medical History:   Diagnosis Date    Extremity pain right     Joint pain     Migraine     Tarsal tunnel syndrome of left side          Past Surgical History:   Procedure Laterality Date    ABDOMINOPLASTY      APPENDECTOMY      AUGMENTATION MAMMAPLASTY Bilateral      SECTION      x2    COLONOSCOPY      INGUINAL HERNIA REPAIR Right 2024    Procedure: LAPAROSCOPIC RIGHT FEMORAL HERNIA REPAIR WITH MESH;  Surgeon: Dmitry Edmond MD;  [facility];  Service: General;  Laterality: Right;    TARSAL TUNNEL RELEASE Bilateral          Family History   Problem Relation Age of Onset    Breast cancer Maternal Grandmother         age unknown    Hyperlipidemia Mother     Ovarian cancer Neg Hx          Social History     Socioeconomic History    Marital status:    Tobacco Use    Smoking status: Never    Smokeless tobacco: Never   Vaping Use    Vaping status: Never Used   Substance and Sexual Activity    Alcohol use: Yes     Alcohol/week: 1.0 standard drink of alcohol     Types: 1 Drinks containing 0.5 oz of alcohol per week     Comment: rarely    Drug use: Never    Sexual activity: Yes     Partners: Male     Birth  "control/protection: Surgical     Comment: Tubal and uterine ablation.           Current Outpatient Medications:     magnesium chloride ER 64 MG DR tablet, Take 1 tablet by mouth Every Night., Disp: , Rfl:       No Known Allergies      Ht 162.6 cm (64\")   Wt 78 kg (172 lb)   BMI 29.52 kg/m²       Physical Exam:  Constitutional: Patient appears well-developed, well-nourished, well-hydrated, appears younger than stated age  HEENT: Head: Normocephalic and atraumatic  Eyes: Conjunctivae and lids are normal  Pupils: Equal, round, reactive to light  Peripheral vascular exam: Posterior tibialis: right 2+ and left 2+. Dorsalis pedis: right 2+ and left 2+. No edema.   Musculoskeletal   Gait and station: Gait evaluation demonstrated a normal gait. Able to walk on heels, toes, tandem walking   Lumbar Spine: Passive and active range of motion are limited secondary to pain. Extension, flexion, lateral flexion, rotation of the lumbar spine increased and reproduced pain. Lumbar facet joint loading maneuvers are positive.  Sacroiliac Joints: Tavo's test: Gaenslen's test: thigh thrust test: SI compression test posterior shear test:  SI distraction test: pelvic rock test: Yeoman's test: Negative   Piriformis maneuvers: Negative   Right Hip Joint: The range of motion of the hip joint is almost full and without pain   Left Hip Joint: The range of motion of the hip joint is almost full and without pain   Palpation of the bilateral psoas tendons and iliopsoas bursas: Unrevealing   Palpation of the bilateral greater trochanters: Unrevealing   Examination of the Iliotibial band: Unrevealing   Neurological:   Patient is alert and oriented to person, place, and time.   Speech: Normal.   Cortical function: Normal mental status.   Reflex Scores:  Right patellar: 0+  Left patellar: 0+  Right Achilles: 0+  Left Achilles: 0+  Motor strength: 5/5  Motor Tone: Normal  Involuntary movements: None.   Superficial/Primitive Reflexes: Primitive " reflexes were absent.   Right Lofton: Absent  Left Lofton: Absent  Right ankle clonus: Absent  Left ankle clonus: Absent   Babinsky: Absent  Long tract signs: Negative. Straight leg raising test: Negative. Femoral stretch sign: Negative.   Sensory exam: Intact to light touch, intact pain and temperature sensation, intact vibration sensation and normal proprioception  Coordination:  Finger to nose: Normal. Balance: Normal Romberg's sign: Negative   Skin and subcutaneous tissue: Skin is warm and intact. No rash noted. No cyanosis.   Psychiatric: Judgment and insight: Normal. Recent and remote memory: Intact. Mood and affect: Normal.       ASSESSMENT:   1. Spondylosis of lumbar region without myelopathy or radiculopathy    2. Effusion of lumbar facet joint    3. Lumbar radiculitis    4. Degeneration of lumbar or lumbosacral intervertebral disc    5. Lumbar spine instability    6. Other idiopathic scoliosis, lumbar region        PLAN/MEDICAL DECISION MAKING:  Ms. Santa Rojas, 54 y.o. female presents with new onset of severe right sided lower back and right lower extremity pain. Santa Rojas was last seen on 11/29/2021 when she underwent diagnostic and therapeutic left sacroiliac joint injection combined with left piriformis trigger point injection, from which she has experienced 100% ongoing pain relief and functional improvement. Historically, she always had lower back pain on her left side. Santa contacted me on Friday 08/16/2024, with a new complaint of RT sided LBP and RT LE pain. Santa Rojas recently underwent uneventful right femoral and obturator hernia laparoscopic surgical repair by Dr. Edmond on 07/18/2024. About 4 days later, she woke up with severe right-sided lower back pain radiating to the lateral aspect of the right leg up to the right ankle. She came to the ER at [facility] on 07/24/2024 for evaluation of severe lower back pain. She was 6 days status post hernia repair. She stated that she has done well  after surgery until just the day before her ER visit. She complained of severe right-sided lower back pain radiating into the right lower extremity. She denied bladder or bowel issues. She started a Medrol dose pack that progressively provided pain relief. She is doing well during the daytime. During the nighttime, she experiences significant lower back pain with some radiation into the RLE affecting her sleep. She failed a medrol dose pack, opioids, muscle relaxers, ETC. MRI from her ER visit reviewed revealed lumbar facet effusions RT at L3-L4 and RT L4-L5. Loss of disc height at L3-L4. I ordered full lumbar X-rays with flexion/extension to assess stability. A comprehensive evaluation including history and physical exam along with pertinent physiologic and functional assessment was performed. Patient presents with intractable pain due to the diagnoses listed above. Patient has failed to respond to conservative modalities, as referenced under HPI. Patient has responded well to minimally invasive interventional pain management measures, as referenced from previous notes and under HPI. I have documented the impact of patient's moderate-to-severe pain contributing to significant impairment in daily activities, ADLs, and a negative impact on the patient's quality of life, as reflected on Global Pain Scale 16/100; The [place] Back Pain Disability Scale 26/100; The [employer] Osteoarthritis Index (WOMAC) to assess pain, stiffness, and physical function in patients with hip osteoarthritis 0/96; Tinetti Gait & Balance Assessment Tool  (low risk for falls). I have reviewed pertinent supporting diagnostic studies of patient's chronic pain condition as well as all available pertinent medical records to patient's chronic pain condition including previous therapies, as referenced above.  PHQ-2 Depression Screening 0; Pain Self-Efficacy Questionnaire (PSEQ) 60/60. I had a lengthy conversation with  Ms. Santa Rojas regarding her chronic pain condition and potential therapeutic options including risks, benefits, alternative therapies, to name a few. We have discussed using a stepwise approach starting with the least intense level of care as determined by the extent required to diagnose and or treat a patient's condition. The proposed treatments are consistent with the patient's medical condition and known to be safe and effective by current guidelines and the standard of care. The duration and frequency proposed are considered appropriate for the service in accordance with accepted standards of medical practice for the diagnosis and treatment of the patient's condition and intended to improve the patient's level of function. These services will be furnished in a setting appropriate to the patient's medical needs and condition. Therefore, I have proposed the following plan:    1. Interventional pain management measures: None indicated at this time as pain has been decreasing. Patient does not take blood thinners. If her pain increases, I have discussed with her the possibility of diagnostic and therapeutic right L3-L4 and right L4-L5 transforaminal epidural steroid injections along with arthrocentesis of her facet joint effusions and facet joint injections at the same levels with steroids using the lowest effective dose of steroids, under C-arm fluoroscopic guidance, with the use of contrast dye to confirm appropriate needle placement and spread of contrast dye. Other options for treatment of patient's pain would include a first set of diagnostic right lumbar medial branch blocks at L2, L3, L4; for right lumbar facet joints at L3-L4, and L4-L5, clarify the origin of chronic refractory mechanical lower back pain. If the patient experiences 80% or more relief along with significant functional improvement and increase in the range of motion of the lumbar spine, then, the patient will be scheduled for a second set  "of diagnostic right lumbar medial branch blocks, to then, proceed with right lumbar medial branch rhizotomies. Santa Rojas underwent left-sided lumbar RFTCs with me several years ago (2017, 06/02/2021) experiencing 100% ongoing pain relief and functional improvement. Other options; Minuteman, Vertiflex, PNS Sprint, regenerative therapies    2. Diagnostic studies: None indicated at this time. If she continues experiencing pain, we may obtain a CT scan of the lumbar spine to better assess her joint anatomy Vs MRI w/wo.    3. Pharmacological measures: Reviewed and discussed; Patient takes tizanidine QHS PRN. Patient has declined additional pharmacological measures     4. Long-term rehabilitation efforts:  A. The patient does not have a history of falls. I performed a risk assessment for falls using the Tinetti gait & balance assessment tool (scored low risk for falls).   B. Patient will start a comprehensive physical therapy program for Alter-G, gait and balance training, neurodynamics, core strengthening, gluteal and abductor strengthening, ultrasound, ASTYM, E-STIM, myofascial release, cupping, dry needling, home exercise program, 2-3 x per week for 8 weeks   C. Contrast therapy: Apply ice-packs for 15-20 minutes, followed by heating pads for 15-20 minutes to affected area   D. Start a low impact exercise program such as water therapy, swimming, yoga, Pilates  E. Prescription for Pulsed Electro-Magnetic Field (PEMF) Therapy device \"Oska Pulse\" for treatment of this patient's chronic pain condition.   F. Santa Rojas  reports that she has never smoked. She has never used smokeless tobacco.     5. The patient has been instructed to contact my office with any questions or difficulties. The patient understands the plan and agrees to proceed accordingly.    The patient has a documented plan of care to address chronic pain. Santa Rojas reports a pain score of 1-4/10.  Given her pain assessment as noted, treatment " options were discussed and the following options were decided upon as a follow-up plan to address the patient's pain: continuation of current treatment plan for pain, educational materials on pain management, home exercises and therapy, prescription for non-opiod analgesics, referral to Physical Therapy, referral to specialist for assistance in pain treatment guidance, steroid injections, use of non-medical modalities (ice, heat, stretching and/or behavior modifications), and interventional pain management therapies .               Pain Management Panel           No data to display                 KARINA query complete. KARINA reviewed by Cresencio Durham MD.       No orders of the defined types were placed in this encounter.     Total Time spent including face-to-face with the patient: 53 minutes    Please note that portions of this note were completed with a voice recognition program.   Any copied data in any portion of my note has been reviewed by myself and accurate.     The 21st Century Cures Act makes medical notes like this available to patients in the interest of transparency. This is a medical document intended as peer to peer communication. It is written in medical language and may contain abbreviations or verbiage that are unfamiliar. It may appear blunt or direct. Medical documents are intended to carry relevant information, facts as evident, and the clinical opinion of the practitioner.     Cresencio Durham MD    Patient Care Team:  Mavis Rodriges DO as PCP - General (Family Medicine)  Cresencio Durham MD as Consulting Physician (Pain Medicine)  Renee Saxena MD as Consulting Physician (Obstetrics and Gynecology)     No orders of the defined types were placed in this encounter.        No future appointments.

## 2024-08-16 NOTE — TELEPHONE ENCOUNTER
Spoke with Santa. Could not complete a telehealth video visit due to staff not available to schedule visit and not having access to the appointment desk. She agreed to come in Tuesday at 6:30.   New pain: RT LB radiating to the lateral aspect of the right leg to the ankle.  Had recent hernia surgery with Dr Edmond 7/18. Was released to work and exercise. Developed sudden onset of pain, went to ER, had MRI  MRI reviewed. Discussed findings. Lumbar facet effusions RT>>> LT L4-L5. Loss of disc height L3-L4.  Will get X-rays to assess stability

## 2024-08-17 PROBLEM — M51.379 DEGENERATION OF LUMBAR OR LUMBOSACRAL INTERVERTEBRAL DISC: Status: ACTIVE | Noted: 2024-08-17

## 2024-08-17 PROBLEM — M51.37 DEGENERATION OF LUMBAR OR LUMBOSACRAL INTERVERTEBRAL DISC: Status: ACTIVE | Noted: 2024-08-17

## 2024-08-19 ENCOUNTER — HOSPITAL ENCOUNTER (OUTPATIENT)
Dept: GENERAL RADIOLOGY | Facility: HOSPITAL | Age: 54
Discharge: HOME | End: 2024-08-19
Admitting: ANESTHESIOLOGY
Payer: COMMERCIAL

## 2024-08-19 DIAGNOSIS — M47.816 SPONDYLOSIS OF LUMBAR REGION WITHOUT MYELOPATHY OR RADICULOPATHY: ICD-10-CM

## 2024-08-19 DIAGNOSIS — M41.9 SCOLIOSIS OF LUMBAR SPINE, UNSPECIFIED SCOLIOSIS TYPE: ICD-10-CM

## 2024-08-19 PROBLEM — M25.48 EFFUSION OF LUMBAR FACET JOINT: Status: ACTIVE | Noted: 2024-08-19

## 2024-08-19 PROCEDURE — 72114 X-RAY EXAM L-S SPINE BENDING: CPT

## 2024-08-20 ENCOUNTER — OFFICE VISIT (OUTPATIENT)
Dept: PAIN MEDICINE | Facility: CLINIC | Age: 54
End: 2024-08-20
Payer: COMMERCIAL

## 2024-08-20 VITALS — WEIGHT: 172 LBS | HEIGHT: 64 IN | BODY MASS INDEX: 29.37 KG/M2

## 2024-08-20 DIAGNOSIS — M41.26 OTHER IDIOPATHIC SCOLIOSIS, LUMBAR REGION: ICD-10-CM

## 2024-08-20 DIAGNOSIS — M54.16 LUMBAR RADICULITIS: ICD-10-CM

## 2024-08-20 DIAGNOSIS — M25.48 EFFUSION OF LUMBAR FACET JOINT: ICD-10-CM

## 2024-08-20 DIAGNOSIS — M53.2X6 LUMBAR SPINE INSTABILITY: ICD-10-CM

## 2024-08-20 DIAGNOSIS — M51.37 DEGENERATION OF LUMBAR OR LUMBOSACRAL INTERVERTEBRAL DISC: ICD-10-CM

## 2024-08-20 DIAGNOSIS — M47.816 SPONDYLOSIS OF LUMBAR REGION WITHOUT MYELOPATHY OR RADICULOPATHY: ICD-10-CM

## 2024-08-20 PROCEDURE — 99215 OFFICE O/P EST HI 40 MIN: CPT | Performed by: ANESTHESIOLOGY

## 2024-09-23 ENCOUNTER — TELEPHONE (OUTPATIENT)
Dept: PAIN MEDICINE | Facility: CLINIC | Age: 54
End: 2024-09-23
Payer: COMMERCIAL

## 2024-09-23 DIAGNOSIS — M99.73 CONNECTIVE TISSUE AND DISC STENOSIS OF INTERVERTEBRAL FORAMINA OF LUMBAR REGION: ICD-10-CM

## 2024-09-23 DIAGNOSIS — M54.16 LUMBAR RADICULITIS: ICD-10-CM

## 2024-09-23 DIAGNOSIS — M25.48 EFFUSION OF LUMBAR FACET JOINT: Primary | ICD-10-CM

## 2024-10-03 ENCOUNTER — DOCUMENTATION (OUTPATIENT)
Dept: PAIN MEDICINE | Facility: CLINIC | Age: 54
End: 2024-10-03

## 2024-10-03 ENCOUNTER — OUTSIDE FACILITY SERVICE (OUTPATIENT)
Dept: PAIN MEDICINE | Facility: CLINIC | Age: 54
End: 2024-10-03
Payer: COMMERCIAL

## 2024-10-03 PROCEDURE — 64484 NJX AA&/STRD TFRM EPI L/S EA: CPT | Performed by: ANESTHESIOLOGY

## 2024-10-03 PROCEDURE — 64483 NJX AA&/STRD TFRM EPI L/S 1: CPT | Performed by: ANESTHESIOLOGY

## 2024-10-03 NOTE — PROGRESS NOTES
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 Connective tissue and disc stenosis of intervertebral foramina of lumbar region   2. Lumbar facet joint synovial cyst  3. Effusion of lumbar facet joint  4. Lumbar radiculitis   5. Degeneration of lumbar or lumbosacral intervertebral disc   6. Spondylosis of lumbar region without myelopathy or radiculopathy   7. Lumbar spine instability   8. Other idiopathic scoliosis, lumbar region mbar region    PROCEDURES PERFORMED:   Diagnostic and therapeutic right L3-L4 transforaminal epidural steroid injection  Diagnostic and therapeutic right L4-L5 transforaminal epidural steroid injection  Arthrocentesis and facet joint injection right L3-L4   Arthrocentesis and facet joint injection right L4-L5     ANESTHESIA: Local anesthesia ONLY    COMPLICATIONS: None    EBL: 0    MEDICAL NECESSITY: A comprehensive evaluation, including history and physical exam and pertinent physiologic and functional assessment, was performed. The patient presents with intractable pain due to the diagnoses listed above. The patient has failed to respond to conservative modalities, as referenced under HPI including the impact of patient's moderate-to-severe pain contributing to significant impairment in daily activities, ADLs, and a negative impact on quality of life. Supporting diagnostic studies of patient's chronic pain condition have been reviewed. MRI from ER visit revealed large lumbar facet joint effusions at right L3-L4 and right L4-L5. Loss of disc height at L3-L4 with NF stenosis. We have discussed using a stepwise approach  starting with the shortest or least intense level of treatment, care, or service as determined by the extent required to diagnose and or treat a patient's condition. The treatments proposed are consistent with the patient's medical condition and are known to be as safe and effective by current guidelines and standard of care. See OV note for additional details.    PROCEDURE SUMMARY: After explaining all the risks and benefits of the procedure, an informed consent was obtained.  The patient's surgical site was confirmed with the patient and marked in the holding room accordingly. The patient was transferred to the procedure room and placed on the operating room table in the prone position. Time out was completed. Noninvasive monitors were applied. The patient declined administration of IV sedation by a designated procedure room nurse, who monitored the patient's level of consciousness and physiological status. Pertinent information was reported on a sedation flow sheet, which is part of the patient's permanent medical records. Start procedure time: 07:21 AM. The patient's vital signs remained within normal limits. The lumbar spine area was prepped and draped in a sterile fashion. Using C-arm fluoroscopic guidance, the six o'clock area of the right L3 and right L4 pedicles (targets) were identified.  The skin and tissues overlying the targets were anesthetized with three ml of 1% lidocaine followed by 5 ml of 0.25% bupivacaine. Then, 22-gauge styletted needles were advanced into the most posterior and superior portion of the right L3-L4 and right L4-L5 neural foramina without any difficulties. The patient did not experience paresthesia. AP and lateral X-ray views were obtained confirming appropriate needle placement. Aspiration was negative for blood and/or CSF. One ml of Omnipaque-240 was injected per lumbar level and contrast dye was seen bathing the right L3 and right L4 nerve roots with spread into the anterior  epidural space. X-rays were obtained and scanned in the patient's chart. Two ml of 0.25% bupivacaine with 3 mg of dexamethasone were injected per level. Using C-arm fluoroscopic guidance, the right L3-L4 and right L4-L5 lumbar facet joints (targets) were identified. The skin and tissues overlying the targets were anesthetized with 2 ml of 1% lidocaine followed by 10 ml of 0.25% bupivacaine. Then, 20-gauge styletted needles were advanced under direct C-arm fluoroscopic guidance into the right L3-L4 and right L4-L5 lumbar facet joints without difficulties. Aspiration of the lumbar facet joints rendered 0.5 ml of synovial fluid at L3-L4, and 1.4 at the right L4-L5 lumbar facet joint. Then, 0.5 ml of Omnipaque-240 was injected per lumbar facet joint and arthrograms were obtained revealing a large lumbar facet joint synovial cyst at L4-L5. Ten ml of preservative-free normal saline were slowly injected into the right L4-L5 lumbar facet joint until the cyst ruptured and contrast dye was seen spreading outside of the joint space (AP and lateral views were obtained and will be scanned in patient's chart). Then, 0.5 ml of 0.25% bupivacaine with 2 mg of dexamethasone was injected into the lumbar facet joint. X-rays were obtained and scanned in the patient's chart. Fluoroscopy was utilized using low-dose of radiation applying collimation, pulsed mode, and shielding following ALARA recommendations.  Fluoroscopy time: 42 seconds. End procedure time: 07:33 AM. The patient tolerated the procedure well and without incident.  Upon completion of the procedure, the patient was transferred to the recovery room in stable condition. The [facility] was [facility] neurologically intact and with appropriate discharge instructions. Pain level before procedure: 7/10. Pain level after procedure: 0/10.    PLAN:  Follow-up with me on an as needed basis, or in 10 weeks Telehealth Vs in office. Santa will let me know. We may  repeat therapeutic right L3-L4 and right L4-L5 transforaminal epidural steroid injections depending on patient's outcome. Other options for treatment of patient's pain would include a first set of diagnostic right lumbar medial branch blocks at L2, L3, L4; for right lumbar facet joints at L3-L4, and L4-L5, clarify the origin of chronic refractory mechanical lower back pain. If the patient experiences 80% or more relief along with significant functional improvement and increase in the range of motion of the lumbar spine, then, the patient will be scheduled for a second set of diagnostic right lumbar medial branch blocks, to then, proceed with right lumbar medial branch rhizotomies. Santa Rojas underwent left-sided lumbar RFTCs with me several years ago (2017, 06/02/2021) experiencing 100% ongoing pain relief and functional improvement. Other options; Minuteman, Vertiflex, PNS Sprint, regenerative therapies  Diagnostic studies: See OV note.   Pharmacological measures: See OV note.  Long-term rehabilitation efforts: See OV note.  The patient has been instructed to contact my office with any questions or difficulties. The patient understands the plan and agrees to proceed accordingly.      Signatures  Electronically signed by: Cresencio Durham M.D.; OCTOBER 03, 2024, 07:51 AM EST (Author)

## 2024-12-20 ENCOUNTER — TELEPHONE (OUTPATIENT)
Dept: PAIN MEDICINE | Facility: CLINIC | Age: 54
End: 2024-12-20
Payer: COMMERCIAL

## 2024-12-20 DIAGNOSIS — M99.73 CONNECTIVE TISSUE AND DISC STENOSIS OF INTERVERTEBRAL FORAMINA OF LUMBAR REGION: ICD-10-CM

## 2024-12-20 DIAGNOSIS — M53.3 SACROILIAC JOINT DYSFUNCTION OF LEFT SIDE: ICD-10-CM

## 2024-12-20 NOTE — TELEPHONE ENCOUNTER
Spoke with pt regarding how she is doing since her last injection in October. Pt reports that she got about 11 weeks of 100% relief, where she reports no pain and much improved functionality. Pt reports her pain to be a 6/10 and the pain has returned. I advised that we are working on getting a procedure approved for next week to help. No further needs expressed.

## 2024-12-24 ENCOUNTER — TELEMEDICINE (OUTPATIENT)
Dept: PAIN MEDICINE | Facility: CLINIC | Age: 54
End: 2024-12-24
Payer: COMMERCIAL

## 2024-12-24 VITALS — HEIGHT: 64 IN | WEIGHT: 172 LBS | BODY MASS INDEX: 29.37 KG/M2

## 2024-12-24 DIAGNOSIS — M51.370 DEGENERATION OF INTERVERTEBRAL DISC OF LUMBOSACRAL REGION WITH DISCOGENIC BACK PAIN: ICD-10-CM

## 2024-12-24 DIAGNOSIS — M79.18 MYOFASCIAL PAIN: ICD-10-CM

## 2024-12-24 DIAGNOSIS — M53.2X6 LUMBAR SPINE INSTABILITY: ICD-10-CM

## 2024-12-24 DIAGNOSIS — M53.3 SACROILIAC JOINT DYSFUNCTION OF LEFT SIDE: ICD-10-CM

## 2024-12-24 DIAGNOSIS — M25.48 EFFUSION OF LUMBAR FACET JOINT: ICD-10-CM

## 2024-12-24 DIAGNOSIS — G57.01 SCIATIC NEUROPATHY, RIGHT: Primary | ICD-10-CM

## 2024-12-24 DIAGNOSIS — M41.9 SCOLIOSIS OF LUMBAR SPINE, UNSPECIFIED SCOLIOSIS TYPE: ICD-10-CM

## 2024-12-24 DIAGNOSIS — G57.01 SCIATIC NEUROPATHY, RIGHT: ICD-10-CM

## 2024-12-24 DIAGNOSIS — M47.816 SPONDYLOSIS OF LUMBAR REGION WITHOUT MYELOPATHY OR RADICULOPATHY: ICD-10-CM

## 2024-12-24 DIAGNOSIS — M70.61 GREATER TROCHANTERIC BURSITIS OF BOTH HIPS: ICD-10-CM

## 2024-12-24 DIAGNOSIS — M62.85 DYSFUNCTION OF THE MULTIFIDUS MUSCLE OF LUMBAR REGION: ICD-10-CM

## 2024-12-24 DIAGNOSIS — M62.838 SPASM OF RIGHT PIRIFORMIS MUSCLE: ICD-10-CM

## 2024-12-24 DIAGNOSIS — M70.62 GREATER TROCHANTERIC BURSITIS OF BOTH HIPS: ICD-10-CM

## 2024-12-24 DIAGNOSIS — M99.73 CONNECTIVE TISSUE AND DISC STENOSIS OF INTERVERTEBRAL FORAMINA OF LUMBAR REGION: ICD-10-CM

## 2024-12-24 PROCEDURE — 99215 OFFICE O/P EST HI 40 MIN: CPT | Performed by: ANESTHESIOLOGY

## 2024-12-24 RX ORDER — TIZANIDINE HYDROCHLORIDE 2 MG/1
2 CAPSULE, GELATIN COATED ORAL 3 TIMES DAILY
COMMUNITY

## 2024-12-24 NOTE — PROGRESS NOTES
"Type of Service: Consult via telemedicine  Mode of transmission: Audiovisual Optracet 2-way interactive A/V telecommunication     You have chosen to receive care through a telehealth visit.    Do you consent to use a video/audio connection for your medical care today? Yes    Any physical exam was performed by the patient using active maneuvers per pertinent instructions.    All communications with the patient were documented in the patient's medical record per documentation standards.    Telemedicine Provider: Cresencio Durham MD   Location of Physician: Hospital Office   Patient location: Home   Facilitator/intake: Katie Flor CMA     Chief Complaint: \"Pain in my hips, right buttocks and right leg\"      Chronic Pain History: Ms. Santa Rojas, 54 y.o. female, last seen on 10/03/2024, when she underwent diagnostic and therapeutic right L3-L4 and right L4-L5 transforaminal epidural steroid injections combined with arthrocentesis and facet joint injections at right L3-L4 and right L4-L5, from which she experienced 100% pain relief and functional improvement that lasted for about 10-11 weeks. Santa reports that she started experiencing bilateral posterior hip pain, right buttock pain and right lower extremity pain that resembled the pain she experienced on her left side several years ago and that responded well back in 2021 to left piriformis injection/left sciatic nerve block at the piriformis recess combined with greater trochanteric bursa injections. Santa contacted me last week to discuss interventional pain management measures as this is a recurrent issue that has failed to previous and ongoing conservative measures. She denies other symptoms, or any new bladder or bowel issues. MRI from her last ER visit revealed lumbar facet effusions RT at L3-L4 and RT L4-L5. Loss of disc height at L3-L4. I ordered full lumbar X-rays with flexion/extension to assess stability, which she completed on 08/19/2024 and revealed " multilevel degenerative disc changes a little more advanced at L3-L4. Lumbar spine with mild scoliosis. No significant instability.  Pain Description: Constant bilateral posterior hip pain and right gluteal pain with intermittent exacerbation, described as aching, dull, sharp, and stabbing sensation.   Radiation of Pain: The pain radiates from the right gluteal region into the posterior aspect of her right lower extremity to the right ankle  Pain intensity today: 7/10   Average pain intensity last week: 7/10  Pain intensity ranges from: 4/10 to 9/10  Aggravating factors: Pain increases with arching her back, protracted sitting, standing, walking.   Alleviating factors: Unable to identify at this time.   Associated Symptoms:   Patient reports pain, but denies numbness or weakness in the right lower extremity. Patient denies symptoms in the opposite limb  Patient denies any new bladder or bowel problems.   Patient denies difficulties with her balance or recent falls.   Pain interferes with general activities and affects patient's quality of life  Pain interferes with sleep: Falling asleep and causing sleep fragmentation   Muscle spasms: RT gluteal region/RT leg  Stiffness:    Review of previous therapies and additional medical records:  Santa Rojas has already failed the following measures, including:   Conservative Measures: Oral analgesics, steroids, opioids, home exercise program, independent exercise program  Interventional Measures:   10/03/2024: Diagnostic and therapeutic right L3-L4 and right L4-L5 transforaminal epidural steroid injections combined with arthrocentesis and facet joint injections right L3-L4 and right L4-L5: 100% pain relief for > 10 weeks   11/29/2021: Therapeutic left sacroiliac joint injection combined with therapeutic left piriformis trigger point injection  06/02/2021: Left lumbar medial branch RFA combined with trigger point injection of the left piriformis muscle.  04/07/2021:  Diagnostic and therapeutic left sacroiliac joint injection combined with diagnostic and therapeutic Left piriformis muscle injection   12/20/2017: Lumbar medial branch rhizotomies combined with left GTB injection and trigger point injections at the left gluteus medius muscle   10/23/2017: DxTx left lumbar facet joint injections L4-L5 and L5-S1, DxTx left GTB injection, DxTx TPIs at the left gluteus medius x3  Surgical Measures: No history of previous cervical spine, lumbar spine or hip surgery   Santa Rojas has not undergone orthopedic spine or neurosurgical consultation for her chronic pain condition   Santa Rojas is a healthy adult other than for chronic pain   In terms of current analgesics, Santa Rojas takes: tizanidine PRN    I have reviewed Ramon Report consistent with medication reconciliation.  SOAPP/ORT: Low Risk     Little interest or pleasure in doing things? Not at all   Feeling down, depressed, or hopeless? Not at all   PHQ-2 Total Score 0       Pain Self-Efficacy Questionnaire (PSEQ)  ITEM 08-20 2024 12-24 2024           I can enjoy things despite the pain. 6 4            I can do most of the household chores (tidying up, washing dishes, etc), despite the pain. 6  4           I can socialize with my friends or family members as often as I used to do, despite the pain. 6  6           I can cope with my pain in most situations. 6  6           I can do some form of work, despite the pain (includes housework, paid, and unpaid work). 6  4           I can still do many of the things I enjoy doing, such as hobbies or leisure activity despite pain. 6  2           I can cope with my pain without medications. 6  3           I can accomplish most of my goals in life despite the pain. 6  0           I can live in a normal lifestyle, despite the pain. 6  0           I can gradually become more active, despite the pain. 6  0           TOTAL SCORE 60/60  29/60                 Global Pain Scale 08-20 2024  12-24 2024               Pain 12 12                Feelings 0  5               Clinical outcomes 0  8               Activities 4  0               GPS Total: 16  25                  The [place] Back Pain Disability Scale   DATE 08-20 2024 12-24 2024                Sleep through the night 4  4               Turn over in bed 3  3               Get out of bed 2  2               Make your bed 0  2               Put on socks (pantyhose) 0  2               Ride in a car 0  3               Sit in a chair for several hours 0  5               Stand up for 20-30 minutes 0  1               Climb one flight of stairs 1  1               Walk a few blocks (200-300 yards)  0  1               Walk several miles 3  3               Run one block (about 50 yards) 4  5               Take food out of the refrigerator 0  0               Reach up to high shelves 0  1               Move a chair 0  1               Pull or push heavy doors 0  3               Bend over to clean the bathtub 2  4               Throw a ball 0  1               Carry two bags of groceries 2  1               Lift and carry a heavy suitcase 5  4               Total score 26  47                  Review of Diagnostic Studies:  I have independently reviewed and interpreted the images with the patient and used the images and a tridimensional spine model to explain findings. I have also reviewed the reports.  Complete x-rays of the lumbar spine including flexion-extension 08/19/2024 no significant instability  MRI of the lumbar spine wo contrast on 07/24/2024 revealed preservation of vertebral body heights and alignment.  Loss of disc height at L3-L4 and L4-L5.  There is paraspinal: Conus medullaris appear unremarkable.  The conus is seen at L2.  Axial imaging:  T12-L1, L1-L2, L2-L3: No significant neural foraminal or spinal canal stenosis.  L3-L4: Circumferential disc bulge, minimal endplate osteophyte formation. Moderate right-sided facet arthropathy with facet joint  effusion and edema along the posterior margin of the facet joint. Mild foraminal stenosis.  L4-L5: Circumferential disc bulge. Moderate right-sided facet arthropathy with facet joint effusion and surrounding soft tissue edema.  L5-S1: No significant neural foraminal or spinal canal stenosis.  EMG/NCV of the bilateral lower extremities on 12/21/2021 by Dr. Tavo Cash. No evidence of focal nerve entrapment or radiculopathy.  X-ray spine scoliosis 05/28/2021: Levoscoliotic curvature at the thoracolumbar junction at T12-L1, measured at 16.5 degrees.  Lumbar spine x-rays with flexion-extension views 5 28,021: Arthritic changes within the lumbar spine with slight curvature with convexity to the left. Degenerative changes within the sacroiliac joints.   Hip X-rays 09/25/2017: Mild degenerative changes of the hips.   Xray Spine Lumbar on 09/25/2017. Mild lumbar scoliosis with the convexity to the left measuring approximately 7.7 degrees.     The following portions of the patient's history were reviewed and updated as appropriate: problem list, past medical history, past surgery history, social history, family history, medication reconciliation, and allergies    Review of Systems      Patient Active Problem List   Diagnosis    Spondylosis of lumbar region without myelopathy or radiculopathy    Greater trochanteric bursitis of left hip    Myofascial pain syndrome    Scoliosis of lumbar spine    History of migraine headaches    Sacroiliac joint dysfunction of left side    Piriformis syndrome of left side    Femoral hernia of right side    Degeneration of lumbar or lumbosacral intervertebral disc    Effusion of lumbar facet joint    Lumbar radiculitis    Lumbar spine instability    Connective tissue and disc stenosis of intervertebral foramina of lumbar region    Sciatic neuropathy, right    Greater trochanteric bursitis of both hips    Spasm of right piriformis muscle    Myofascial pain    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       Past Medical History:   Diagnosis Date    Extremity pain right     Joint pain     Migraine     Tarsal tunnel syndrome of left side          Past Surgical History:   Procedure Laterality Date    ABDOMINOPLASTY      APPENDECTOMY      AUGMENTATION MAMMAPLASTY Bilateral 2016     SECTION      x2    COLONOSCOPY      INGUINAL HERNIA REPAIR Right 2024    Procedure: LAPAROSCOPIC RIGHT FEMORAL HERNIA REPAIR WITH MESH;  Surgeon: Dmitry Edmond MD;  Location: [facility];  Service: General;  Laterality: Right;    TARSAL TUNNEL RELEASE Bilateral          Family History   Problem Relation Age of Onset    Breast cancer Maternal Grandmother         age unknown    Hyperlipidemia Mother     Ovarian cancer Neg Hx          Social History     Socioeconomic History    Marital status:    Tobacco Use    Smoking status: Never    Smokeless tobacco: Never   Vaping Use    Vaping status: Never Used   Substance and Sexual Activity    Alcohol use: Yes     Alcohol/week: 1.0 standard drink of alcohol     Types: 1 Drinks containing 0.5 oz of alcohol per week     Comment: rarely    Drug use: Never    Sexual activity: Yes     Partners: Male     Birth control/protection: Surgical     Comment: Tubal and uterine ablation.           Current Outpatient Medications:     magnesium chloride ER 64 MG DR tablet, Take 1 tablet by mouth Every Night., Disp: , Rfl:     TiZANidine (ZANAFLEX) 2 MG capsule, Take 1 capsule by mouth 3 (Three) Times a Day., Disp: , Rfl:       No Known Allergies      Ht 162.6 cm (64\")   Wt 78 kg (172 lb)   BMI 29.52 kg/m²       Physical Exam:  Constitutional: Patient appears well-developed, well-nourished, well-hydrated, appears younger than stated age  HEENT: Head: Normocephalic and atraumatic  Eyes: Conjunctivae and lids are normal  Pupils: Equal, round, reactive to light  Peripheral vascular exam: Posterior tibialis: right 2+ and left 2+. Dorsalis pedis: right 2+ and left 2+. No " edema.   Musculoskeletal   Gait and station: Gait evaluation demonstrated a normal gait. Able to walk on heels, toes, tandem walking   Lumbar Spine: Passive and active range of motion are limited secondary to pain. Extension, flexion, lateral flexion, rotation of the lumbar spine increased and reproduced pain. Lumbar facet joint loading maneuvers are positive.  Sacroiliac Joints: Tavo's test: Gaenslen's test: thigh thrust test: SI compression test posterior shear test:  SI distraction test: pelvic rock test: Yeoman's test: Negative   Piriformis maneuvers: Negative   Right Hip Joint: The range of motion of the hip joint is almost full and without pain   Left Hip Joint: The range of motion of the hip joint is almost full and without pain   Palpation of the bilateral psoas tendons and iliopsoas bursas: Unrevealing   Palpation of the bilateral greater trochanters: Unrevealing   Examination of the Iliotibial band: Unrevealing   Neurological:   Patient is alert and oriented to person, place, and time.   Speech: Normal.   Cortical function: Normal mental status.   Reflex Scores:  Right patellar: 0+  Left patellar: 0+  Right Achilles: 0+  Left Achilles: 0+  Motor strength: 5/5  Motor Tone: Normal  Involuntary movements: None.   Superficial/Primitive Reflexes: Primitive reflexes were absent.   Right Lofton: Absent  Left Lofton: Absent  Right ankle clonus: Absent  Left ankle clonus: Absent   Babinsky: Absent  Long tract signs: Negative. Straight leg raising test: Negative. Femoral stretch sign: Negative.   Sensory exam: Intact to light touch, intact pain and temperature sensation, intact vibration sensation and normal proprioception  Coordination:  Finger to nose: Normal. Balance: Normal Romberg's sign: Negative   Skin and subcutaneous tissue: Skin is warm and intact. No rash noted. No cyanosis.   Psychiatric: Judgment and insight: Normal. Recent and remote memory: Intact. Mood and affect: Normal.     ASSESSMENT:   1.  Sciatic neuropathy, right    2. Greater trochanteric bursitis of both hips    3. Spasm of right piriformis muscle    4. 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 Connective tissue and disc stenosis of intervertebral foramina of lumbar region    6. Spondylosis of lumbar region without myelopathy or radiculopathy    7. Degeneration of intervertebral disc of lumbosacral region with discogenic back pain    8. Effusion of lumbar facet joint    9. Lumbar spine instability    10. Scoliosis of lumbar spine, unspecified scoliosis type    11. Sacroiliac joint dysfunction of left side    12. Myofascial pain        PLAN/MEDICAL DECISION MAKING:  Ms. Santa Rojas, 54 y.o. female presents with recurrent bilateral hip pain and right lower extremity pain. Santa was last seen on 10/03/2024, when she underwent diagnostic and therapeutic right L3-L4 and right L4-L5 transforaminal epidural steroid injections combined with arthrocentesis and facet joint injections at right L3-L4 and right L4-L5, from which she experienced 100% pain relief and functional improvement that lasted for about 10-11 weeks. Santa reports that she started experiencing bilateral posterior hip pain, right buttock pain and right lower extremity pain that resembled the pain she had experienced back in 2021 on her left side and that responded well to left piriformis injection/left sciatic nerve block at the piriformis recess combined with greater trochanteric bursa injections. Santa contacted me last week to discuss interventional pain management measures as this is a recurrent issue that has failed previously to ongoing conservative measures. MRI from her last ER visit revealed lumbar facet effusions RT at L3-L4 and RT L4-L5. Loss of disc height at L3-L4. I ordered full lumbar X-rays with flexion/extension to assess stability, which she completed on 08/19/2024 and revealed multilevel degenerative disc changes a little more advanced at L3-L4.  Lumbar spine with mild scoliosis. No significant instability. A comprehensive evaluation including history and physical exam along with pertinent physiologic and functional assessment was performed. Patient presents with intractable pain due to the diagnoses listed above. Patient has failed to respond to conservative modalities, as referenced under HPI. Patient has responded well to minimally invasive interventional pain management measures, as referenced from previous notes and under HPI. I have documented the impact of patient's moderate-to-severe pain contributing to significant impairment in daily activities, ADLs, and a negative impact on the patient's quality of life, as reflected on Global Pain Scale 25/100 (increased from 16/100); The [place] Back Pain Disability Scale 47/100 (increased from 26/100); The [employer] Osteoarthritis Index (WOMAC) to assess pain, stiffness, and physical function in patients with hip osteoarthritis 0/96; Tinetti Gait & Balance Assessment Tool  (low risk for falls). I have reviewed pertinent supporting diagnostic studies of patient's chronic pain condition as well as all available pertinent medical records to patient's chronic pain condition including previous therapies, as referenced above.  PHQ-2 Depression Screening 0; Pain Self-Efficacy Questionnaire (PSEQ) 29/60 (down from 60/60). I had a lengthy conversation with Ms. Santa Rojas regarding her chronic pain condition and potential therapeutic options including risks, benefits, alternative therapies, to name a few. We have discussed using a stepwise approach starting with the least intense level of care as determined by the extent required to diagnose and or treat a patient's condition. The proposed treatments are consistent with the patient's medical condition and known to be safe and effective by current guidelines and the standard of care. The duration and frequency proposed are considered  appropriate for the service in accordance with accepted standards of medical practice for the diagnosis and treatment of the patient's condition and intended to improve the patient's level of function. These services will be furnished in a setting appropriate to the patient's medical needs and condition. Therefore, I have proposed the following plan:    1.  Interventional pain management measures: Patient does not take blood thinners   A. Treatment of Bilateral Posterior Hip Pain/Right Gluteal Pain/Right Lower Extremity Pain: I had a lengthy conversation with Santa regarding her recurrent pain condition.  She presented with a similar condition more than 3 years ago that responded extremely well to minimally invasive interventional pain management measures as referenced in previous notes and under HPI.  The patient will be scheduled for diagnostic and therapeutic right sciatic nerve block at the piriformis recess under C-arm guidance, ultrasound guidance and PNS guidance combined with bilateral greater trochanteric bursa injections with local anesthetic and steroids. If her bursitis continues to be an issue, I have explained that the sensory innervation of the greater trochanters is provided by the trochanteric branches of the femoral nerves. These branches are easily targeted for diagnostic blocks and for radiofrequency thermal ablation. In that case, we could proceed with a first set of diagnostic nerve blocks of the bilateral trochanteric branches of the femoral nerve. If the patient experiences more than 80% pain relief along with functional improvement and ability to perform activities that otherwise would cause pain, then, the patient will be scheduled for a second set of diagnostic nerve blocks of the bilateral trochanteric branches of the femoral nerve, to then, proceed with bipolar radiofrequency ablation of the bilateral trochanteric branches of the femoral nerves.  B. Treatment of dysfunction of the  multifidus/muscle atrophy: Santa Rojas appears to be an appropriate candidate for ReActiv8. Multifidus muscle dysfunction is a common cause of chronic mechanical lower back pain. The multifidus muscle is located along both sides of the lumbar spine playing a crucial role as a deep spinal stabilizer that stabilizes the core and protects the spine during movements. Unfortunately, the multifidus muscle is prone to inhibition, which causes the muscle to shut down and become weak. This inhibition and weakness of the multifidus muscle often goes undetected leading to chronic lower back pain. Santa Rojas is a candidate for ReActiv8 a she presents with multifidus dysfunction evidenced by clinical, functional cues questionnaire, physical assessment, and imaging.   C. Treatment of discogenic pain/angular instability/lateral recess stenosis: we could repeat therapeutic right L3-L4 and right L4-L5 transforaminal epidural steroid injections   Vs Minuteman, Vertiflex, Vs provocative lumbar discography Vs ViaDisc  D. Mechanical Lower back Pain: Not an issue at this time.  Santa Rojas underwent lumbar RFTCs several years ago (2017, 2021) experiencing 100% ongoing pain relief and functional improvement of her axial mechanical lower back pain.     2. Diagnostic studies: None indicated at this time    3. Pharmacological measures: Reviewed and discussed; Patient takes tizanidine QHS PRN. Patient has declined additional pharmacological measures     4. Long-term rehabilitation efforts:  A. The patient does not have a history of falls. I performed a risk assessment for falls using the Tinetti gait & balance assessment tool (scored low risk for falls).   B. Patient will start a comprehensive physical therapy program at [facility], neurodynamics, core strengthening, gluteal and abductor strengthening, ultrasound, ASTYM, E-STIM, myofascial release, cupping, dry needling, and home exercise program, 2-3 x per  "week for 8 weeks   C. Continue contrast therapy: Apply ice-packs for 15-20 minutes, followed by heating pads for 15-20 minutes to affected area   D. Start a low impact exercise program such as water therapy, swimming, yoga, Pilates  E. Prescription for Pulsed Electro-Magnetic Field (PEMF) Therapy device \"Oska Pulse\" for treatment of this patient's chronic pain condition.   F. Santa Rojas  reports that she has never smoked. She has never used smokeless tobacco.     5. The patient has been instructed to contact my office with any questions or difficulties. The patient understands the plan and agrees to proceed accordingly.    The patient has a documented plan of care to address chronic pain. Santa Rojas reports a pain score of 7/10.  Given her pain assessment as noted, treatment options were discussed and the following options were decided upon as a follow-up plan to address the patient's pain: continuation of current treatment plan for pain, educational materials on pain management, home exercises and therapy, prescription for non-opiod analgesics, referral to Physical Therapy, referral to specialist for assistance in pain treatment guidance, steroid injections, use of non-medical modalities (ice, heat, stretching and/or behavior modifications), and interventional pain management measures .               Pain Management Panel           No data to display                 KARINA query complete. KARINA reviewed by Cresencio Durham MD.     Pain Medications               TiZANidine (ZANAFLEX) 2 MG capsule Take 1 capsule by mouth 3 (Three) Times a Day.             No orders of the defined types were placed in this encounter.       Total Time Spent: 61 minutes    Patient verbalizes understanding of instructions for treatment, comfort measures, and follow-up.  Please note that portions of this note were completed with a voice recognition program.   Any copied data in any portion of my note from previous notes included " in the HPI, PE, MDM and/or assessment and plan has been reviewed by myself and accurate as of this date.   The 21st Century Cures Act makes medical notes like this available to patients in the interest of transparency. This is a medical document intended as peer to peer communication. It is written in medical language and may contain abbreviations or verbiage that are unfamiliar. It may appear blunt or direct. Medical documents are intended to carry relevant information, facts as evident, and the clinical opinion of the practitioner.     Cresencio Durham MD    Patient Care Team:  Mavis Rodriges DO as PCP - General (Family Medicine)  Cresencio Durham MD as Consulting Physician (Pain Medicine)  Renee Saxena MD as Consulting Physician (Obstetrics and Gynecology)     No orders of the defined types were placed in this encounter.        No future appointments.

## 2024-12-30 ENCOUNTER — OUTSIDE FACILITY SERVICE (OUTPATIENT)
Dept: PAIN MEDICINE | Facility: CLINIC | Age: 54
End: 2024-12-30
Payer: COMMERCIAL

## 2025-01-02 ENCOUNTER — TELEPHONE (OUTPATIENT)
Dept: PAIN MEDICINE | Facility: CLINIC | Age: 55
End: 2025-01-02
Payer: COMMERCIAL

## 2025-01-02 NOTE — TELEPHONE ENCOUNTER
Called patient to check on how she is feeling after her procedure. No answer, left voicemail and advised patient of follow up with JENS Aviles on 3/11/25.

## (undated) DEVICE — GLV SURG SENSICARE PI MIC PF SZ7 LF STRL

## (undated) DEVICE — GLV SURG SENSICARE PI MIC PF SZ7.5 LF STRL

## (undated) DEVICE — SUT VIC 0 UR5 27IN VCP376H

## (undated) DEVICE — PK LAP LASR CHOLE 10

## (undated) DEVICE — PATIENT RETURN ELECTRODE, SINGLE-USE, CONTACT QUALITY MONITORING, ADULT, WITH 9FT CORD, FOR PATIENTS WEIGING OVER 33LBS. (15KG): Brand: MEGADYNE

## (undated) DEVICE — ENDOPATH XCEL UNIVERSAL TROCAR STABLILITY SLEEVES: Brand: ENDOPATH XCEL

## (undated) DEVICE — GOWN SURG ORBIS LVL3 2XL STRL

## (undated) DEVICE — ANTIBACTERIAL UNDYED BRAIDED (POLYGLACTIN 910), SYNTHETIC ABSORBABLE SUTURE: Brand: COATED VICRYL

## (undated) DEVICE — ENDOCUT SCISSOR TIP, DISPOSABLE: Brand: RENEW

## (undated) DEVICE — 3M™ IOBAN™ 2 ANTIMICROBIAL INCISE DRAPE 6650EZ: Brand: IOBAN™ 2

## (undated) DEVICE — [HIGH FLOW INSUFFLATOR,  DO NOT USE IF PACKAGE IS DAMAGED,  KEEP DRY,  KEEP AWAY FROM SUNLIGHT,  PROTECT FROM HEAT AND RADIOACTIVE SOURCES.]: Brand: PNEUMOSURE

## (undated) DEVICE — SUT SILK 2/0 TIES 18IN A185H

## (undated) DEVICE — ENDOPATH XCEL BLADELESS TROCARS WITH STABILITY SLEEVES: Brand: ENDOPATH XCEL

## (undated) DEVICE — LAPAROVUE VISIBILITY SYSTEM LAPAROSCOPIC SOLUTIONS: Brand: LAPAROVUE

## (undated) DEVICE — LAPAROSCOPIC SMOKE FILTRATION SYSTEM: Brand: PALL LAPAROSHIELD® PLUS LAPAROSCOPIC SMOKE FILTRATION SYSTEM